# Patient Record
Sex: MALE | Employment: STUDENT | URBAN - METROPOLITAN AREA
[De-identification: names, ages, dates, MRNs, and addresses within clinical notes are randomized per-mention and may not be internally consistent; named-entity substitution may affect disease eponyms.]

---

## 2017-06-09 ENCOUNTER — TRANSFERRED RECORDS (OUTPATIENT)
Dept: HEALTH INFORMATION MANAGEMENT | Facility: CLINIC | Age: 22
End: 2017-06-09

## 2019-09-09 ENCOUNTER — OFFICE VISIT (OUTPATIENT)
Dept: FAMILY MEDICINE | Facility: CLINIC | Age: 24
End: 2019-09-09
Payer: COMMERCIAL

## 2019-09-09 VITALS
HEART RATE: 68 BPM | OXYGEN SATURATION: 99 % | DIASTOLIC BLOOD PRESSURE: 74 MMHG | WEIGHT: 158.2 LBS | BODY MASS INDEX: 22.65 KG/M2 | HEIGHT: 70 IN | SYSTOLIC BLOOD PRESSURE: 115 MMHG | TEMPERATURE: 97.4 F | RESPIRATION RATE: 16 BRPM

## 2019-09-09 DIAGNOSIS — J06.9 ACUTE URI: ICD-10-CM

## 2019-09-09 DIAGNOSIS — R05.9 COUGH: Primary | ICD-10-CM

## 2019-09-09 RX ORDER — AZITHROMYCIN 250 MG/1
TABLET, FILM COATED ORAL
Qty: 6 TABLET | Refills: 0 | Status: SHIPPED | OUTPATIENT
Start: 2019-09-09 | End: 2019-09-14

## 2019-09-09 ASSESSMENT — ENCOUNTER SYMPTOMS
FEVER: 0
SHORTNESS OF BREATH: 1
COUGH: 1
CHILLS: 0
FATIGUE: 1

## 2019-09-09 ASSESSMENT — PAIN SCALES - GENERAL: PAINLEVEL: NO PAIN (0)

## 2019-09-09 ASSESSMENT — MIFFLIN-ST. JEOR: SCORE: 1713.84

## 2019-09-09 NOTE — PATIENT INSTRUCTIONS

## 2019-09-09 NOTE — PROGRESS NOTES
"       HPI       Frank Huff is a 23 year old Man who presents with cough and chest tightness. Frank is a generally healthy man. He has not had any significant diseases or illness.   Chief Complaint   Patient presents with     Cough     Pt complains of a cough for the past 2 weeks.     Concern: Cough, chest tightness   Description of the problem :he has been sick for two weeks. He is taking Robitussin during the day and Nyquil at night, senait seltzer cold and cough drops. He is not feeling any better, the cough is disrupting his sleep. He is here for evaluation and treatment.      Problem, Medication and Allergy Lists were reviewed and updated if needed.  Patient is not an established patient of this clinic.         Review of Systems:   Review of Systems     Constitutional:  Positive for fatigue. Negative for fever and chills.   Respiratory:   Positive for cough and shortness of breath.    Cardiovascular:  Positive for chest pain.               Physical Exam:     Vitals:    09/09/19 0750   BP: 115/74   BP Location: Right arm   Patient Position: Sitting   Cuff Size: Adult Regular   Pulse: 68   Resp: 16   Temp: 97.4  F (36.3  C)   TempSrc: Oral   SpO2: 99%   Weight: 71.8 kg (158 lb 3.2 oz)   Height: 1.778 m (5' 10\")     Body mass index is 22.7 kg/m .  Vitals were reviewed and were normal     Physical Exam   Constitutional: He is oriented to person, place, and time.   HENT:   Head: Normocephalic and atraumatic.   Neck: Normal range of motion. Neck supple.   Musculoskeletal: Normal range of motion.   Neurological: He is alert and oriented to person, place, and time.   Skin: Skin is warm and dry.   Psychiatric: He has a normal mood and affect. His behavior is normal.       Results:     None ordered today    Assessment and Plan     1. Cough  2. Acute URI  There are no discontinued medications.  First, due to his presenting symptoms combined with the longevity of his illness, I feel it is appropriate to try a " zpack for his URI. Frank will continue all otc treatment he was doing as well. He will return for follow up with worsening or persisting symptoms. Options for treatment and follow-up care were reviewed with the patient. Frank Huff  engaged in the decision making process and verbalized understanding of the options discussed and agreed with the final plan.  Camila Tracey, ROSARIO, CNP

## 2019-09-09 NOTE — NURSING NOTE
Chief Complaint   Patient presents with     Cough     Pt complains of a cough for the past 2 weeks.         Dameon Pena, EMT on 9/9/2019 at 7:50 AM

## 2020-08-03 ENCOUNTER — VIRTUAL VISIT (OUTPATIENT)
Dept: ORTHOPEDICS | Facility: CLINIC | Age: 25
End: 2020-08-03

## 2020-08-03 DIAGNOSIS — Z98.890 H/O MEDIAL MENISCUS REPAIR OF RIGHT KNEE: ICD-10-CM

## 2020-08-03 DIAGNOSIS — M25.561 ACUTE PAIN OF RIGHT KNEE: Primary | ICD-10-CM

## 2020-08-03 NOTE — LETTER
"8/3/2020       RE: Frank Huff  [address]  [place] [facility] [address]     Dear Colleague,    Thank you for referring your patient, Frank Huff, to the [facility] at [facility]. Please see a copy of my visit note below.    Frank Huff is a 24 year old male who is being evaluated via a billable video visit.      The patient has been notified of following:     \"This video visit will be conducted via a call between you and your physician/provider. We have found that certain health care needs can be provided without the need for an in-person physical exam.  This service lets us provide the care you need with a video conversation.  If a prescription is necessary we can send it directly to your pharmacy.  If lab work is needed we can place an order for that and you can then stop by our lab to have the test done at a later time.    Video visits are billed at different rates depending on your insurance coverage.  Please reach out to your insurance provider with any questions.    If during the course of the call the physician/provider feels a video visit is not appropriate, you will not be charged for this service.\"    Patient has given verbal consent for Video visit? Yes  How would you like to obtain your AVS? MyChart  If you are dropped from the video visit, the video invite should be resent to: Send to e-mail at: [email or web address]  Will anyone else be joining your video visit? No        Video-Visit Details    Type of service:  Video Visit    Video Start Time: 12:59  Video End Time: 1:21    Originating Location (pt. Location): Home    Distant Location (provider location):  [facility] [employer] [facility]     Platform used for Video Visit: Diabeto          PMH:  Right knee meniscal repair surgery [place] 2011    Active problem list:  There is no problem list on file for this patient.      FH:  Family History   Problem Relation Age of Onset     Coronary Artery " Disease Father      Cancer Maternal Grandfather        SH:  Social History     Socioeconomic History     Marital status: Single     Spouse name: Not on file     Number of children: Not on file     Years of education: Not on file     Highest education level: Not on file   Occupational History     Not on file   Social Needs     Financial resource strain: Not on file     Food insecurity     Worry: Not on file     Inability: Not on file     Transportation needs     Medical: Not on file     Non-medical: Not on file   Tobacco Use     Smoking status: Never Smoker     Smokeless tobacco: Never Used   Substance and Sexual Activity     Alcohol use: Not on file     Drug use: Not on file     Sexual activity: Not on file   Lifestyle     Physical activity     Days per week: Not on file     Minutes per session: Not on file     Stress: Not on file   Relationships     Social connections     Talks on phone: Not on file     Gets together: Not on file     Attends Faith service: Not on file     Active member of club or organization: Not on file     Attends meetings of clubs or organizations: Not on file     Relationship status: Not on file     Intimate partner violence     Fear of current or ex partner: Not on file     Emotionally abused: Not on file     Physically abused: Not on file     Forced sexual activity: Not on file   Other Topics Concern     Not on file   Social History Narrative     Not on file     Law student, clerking    MEDS:  See EMR, reviewed  ALL:  See EMR, reviewed    REVIEW OF SYSTEMS:  CONSTITUTIONAL:NEGATIVE for fever, chills, change in weight  INTEGUMENTARY/SKIN: NEGATIVE for worrisome rashes, moles or lesions  EYES: NEGATIVE for vision changes or irritation  ENT/MOUTH: NEGATIVE for ear, mouth and throat problems  RESP:NEGATIVE for significant cough or SOB  BREAST: NEGATIVE for masses, tenderness or discharge  CV: NEGATIVE for chest pain, palpitations or peripheral edema  GI: NEGATIVE for nausea, abdominal pain,  heartburn, or change in bowel habits  :NEGATIVE for frequency, dysuria, or hematuria  :NEGATIVE for frequency, dysuria, or hematuria  NEURO: NEGATIVE for weakness, dizziness or paresthesias  ENDOCRINE: NEGATIVE for temperature intolerance, skin/hair changes  HEME/ALLERGY/IMMUNE: NEGATIVE for bleeding problems  PSYCHIATRIC: NEGATIVE for changes in mood or affect      Subjective: Onset right-sided knee discomfort after athletic activity 4 weeks ago, improved after 2 weeks.  Can think of no particular injury.  6 weeks ago started a home online program of lunges and squats guided by occasional instructor.  Has been doing this regularly.  Also return to pickup basketball twice a week.  Particular in movements of cutting and pivoting and basketball can be uncomfortable but he can think of no particular injury.  The knee does not lock.  Occasional catching sensation.  No persistent swelling.  Status post a right-sided meniscal repair 2011.  Right knee did Well athletically since then.  Basketball is his main sport that he enjoys.  Currently a law student, clerking.    GENERAL: healthy, alert, without distress  EYES: Eyes grossly normal to inspection.   HENT: normal cephalic/atraumatic  NECK: No asymmetry, visible masses or scars  RESP: No audible wheeze, cough, or visible cyanosis.  No visible retractions or increased work of breathing.    SKIN: Visible skin clear. No visible rash, abnormal pigmentation or lesions.  NEURO: Cranial nerves grossly intact.  Mentation and speech appropriate for age.  PSYCH: mentation appears normal, concentration appears appropriate, judgement and insight intact and appearance well groomed  MSK: He can do a full squat today without knee discomfort.  The right knee reveals no effusion.  He can flex and extend it fully.  Overlying skin is intact.    Assessment right-sided knee discomfort x4 weeks, improved in the past 2 weeks.  Suspect patellofemoral discomfort versus possible recurrent  meniscal tear    Plan: We discussed activity modification over the next month.  He plans on avoiding basketball for now which is reasonable.  He is thinking about some biking which is also reasonable.  He will see physical therapy for an alignment training program and proper squat and lunge form.  If he has persistent mechanical symptoms of locking catching swelling over the next 4 weeks despite physical therapy an x-ray and MRI of the knee can be considered.  He had no further questions at this time.          Again, thank you for allowing me to participate in the care of your patient.      Sincerely,    Benigno Molina MD

## 2020-08-03 NOTE — LETTER
Date:August 4, 2020      Patient was self referred, no letter generated. Do not send.        [facility] Health Information

## 2020-08-03 NOTE — PROGRESS NOTES
"Frank Huff is a 24 year old male who is being evaluated via a billable video visit.      The patient has been notified of following:     \"This video visit will be conducted via a call between you and your physician/provider. We have found that certain health care needs can be provided without the need for an in-person physical exam.  This service lets us provide the care you need with a video conversation.  If a prescription is necessary we can send it directly to your pharmacy.  If lab work is needed we can place an order for that and you can then stop by our lab to have the test done at a later time.    Video visits are billed at different rates depending on your insurance coverage.  Please reach out to your insurance provider with any questions.    If during the course of the call the physician/provider feels a video visit is not appropriate, you will not be charged for this service.\"    Patient has given verbal consent for Video visit? Yes  How would you like to obtain your AVS? MyChart  If you are dropped from the video visit, the video invite should be resent to: Send to e-mail at: [email or web address]  Will anyone else be joining your video visit? No        Video-Visit Details    Type of service:  Video Visit    Video Start Time: 12:59  Video End Time: 1:21    Originating Location (pt. Location): Home    Distant Location (provider location):  [facility]     Platform used for Video Visit: Lemonwise          PMH:  Right knee meniscal repair surgery [place] 2011    Active problem list:  There is no problem list on file for this patient.      FH:  Family History   Problem Relation Age of Onset     Coronary Artery Disease Father      Cancer Maternal Grandfather        SH:  Social History     Socioeconomic History     Marital status: Single     Spouse name: Not on file     Number of children: Not on file     Years of education: Not on file     Highest education level: Not on file   Occupational History "     Not on file   Social Needs     Financial resource strain: Not on file     Food insecurity     Worry: Not on file     Inability: Not on file     Transportation needs     Medical: Not on file     Non-medical: Not on file   Tobacco Use     Smoking status: Never Smoker     Smokeless tobacco: Never Used   Substance and Sexual Activity     Alcohol use: Not on file     Drug use: Not on file     Sexual activity: Not on file   Lifestyle     Physical activity     Days per week: Not on file     Minutes per session: Not on file     Stress: Not on file   Relationships     Social connections     Talks on phone: Not on file     Gets together: Not on file     Attends [employer] service: Not on file     Active member of club or organization: Not on file     Attends meetings of clubs or organizations: Not on file     Relationship status: Not on file     Intimate partner violence     Fear of current or ex partner: Not on file     Emotionally abused: Not on file     Physically abused: Not on file     Forced sexual activity: Not on file   Other Topics Concern     Not on file   Social History Narrative     Not on file     Law student, clerking    MEDS:  See EMR, reviewed  ALL:  See EMR, reviewed    REVIEW OF SYSTEMS:  CONSTITUTIONAL:NEGATIVE for fever, chills, change in weight  INTEGUMENTARY/SKIN: NEGATIVE for worrisome rashes, moles or lesions  EYES: NEGATIVE for vision changes or irritation  ENT/MOUTH: NEGATIVE for ear, mouth and throat problems  RESP:NEGATIVE for significant cough or SOB  BREAST: NEGATIVE for masses, tenderness or discharge  CV: NEGATIVE for chest pain, palpitations or peripheral edema  GI: NEGATIVE for nausea, abdominal pain, heartburn, or change in bowel habits  :NEGATIVE for frequency, dysuria, or hematuria  :NEGATIVE for frequency, dysuria, or hematuria  NEURO: NEGATIVE for weakness, dizziness or paresthesias  ENDOCRINE: NEGATIVE for temperature intolerance, skin/hair changes  HEME/ALLERGY/IMMUNE: NEGATIVE  for bleeding problems  PSYCHIATRIC: NEGATIVE for changes in mood or affect      Subjective: Onset right-sided knee discomfort after athletic activity 4 weeks ago, improved after 2 weeks.  Can think of no particular injury.  6 weeks ago started a home online program of lunges and squats guided by occasional instructor.  Has been doing this regularly.  Also return to pickup basketball twice a week.  Particular in movements of cutting and pivoting and basketball can be uncomfortable but he can think of no particular injury.  The knee does not lock.  Occasional catching sensation.  No persistent swelling.  Status post a right-sided meniscal repair 2011.  Right knee did Well athletically since then.  Basketball is his main sport that he enjoys.  Currently a law student, clerking.    GENERAL: healthy, alert, without distress  EYES: Eyes grossly normal to inspection.   HENT: normal cephalic/atraumatic  NECK: No asymmetry, visible masses or scars  RESP: No audible wheeze, cough, or visible cyanosis.  No visible retractions or increased work of breathing.    SKIN: Visible skin clear. No visible rash, abnormal pigmentation or lesions.  NEURO: Cranial nerves grossly intact.  Mentation and speech appropriate for age.  PSYCH: mentation appears normal, concentration appears appropriate, judgement and insight intact and appearance well groomed  MSK: He can do a full squat today without knee discomfort.  The right knee reveals no effusion.  He can flex and extend it fully.  Overlying skin is intact.    Assessment right-sided knee discomfort x4 weeks, improved in the past 2 weeks.  Suspect patellofemoral discomfort versus possible recurrent meniscal tear    Plan: We discussed activity modification over the next month.  He plans on avoiding basketball for now which is reasonable.  He is thinking about some biking which is also reasonable.  He will see physical therapy for an alignment training program and proper squat and lunge form.   If he has persistent mechanical symptoms of locking catching swelling over the next 4 weeks despite physical therapy an x-ray and MRI of the knee can be considered.  He had no further questions at this time.

## 2020-08-03 NOTE — LETTER
"  8/3/2020      RE: Frank Huff  [address] [facility] [address]       Frank Huff is a 24 year old male who is being evaluated via a billable video visit.      The patient has been notified of following:     \"This video visit will be conducted via a call between you and your physician/provider. We have found that certain health care needs can be provided without the need for an in-person physical exam.  This service lets us provide the care you need with a video conversation.  If a prescription is necessary we can send it directly to your pharmacy.  If lab work is needed we can place an order for that and you can then stop by our lab to have the test done at a later time.    Video visits are billed at different rates depending on your insurance coverage.  Please reach out to your insurance provider with any questions.    If during the course of the call the physician/provider feels a video visit is not appropriate, you will not be charged for this service.\"    Patient has given verbal consent for Video visit? Yes  How would you like to obtain your AVS? MyChart  If you are dropped from the video visit, the video invite should be resent to: Send to e-mail at: [email or web address]  Will anyone else be joining your video visit? No        Video-Visit Details    Type of service:  Video Visit    Video Start Time: 12:59  Video End Time: 1:21    Originating Location (pt. Location): Home    Distant Location (provider location):  [facility]     Platform used for Video Visit: DidLog          PMH:  Right knee meniscal repair surgery [place] 2011    Active problem list:  There is no problem list on file for this patient.      FH:  Family History   Problem Relation Age of Onset     Coronary Artery Disease Father      Cancer Maternal Grandfather        SH:  Social History     Socioeconomic History     Marital status: Single     Spouse name: Not on file     Number of children: Not on file     Years of " education: Not on file     Highest education level: Not on file   Occupational History     Not on file   Social Needs     Financial resource strain: Not on file     Food insecurity     Worry: Not on file     Inability: Not on file     Transportation needs     Medical: Not on file     Non-medical: Not on file   Tobacco Use     Smoking status: Never Smoker     Smokeless tobacco: Never Used   Substance and Sexual Activity     Alcohol use: Not on file     Drug use: Not on file     Sexual activity: Not on file   Lifestyle     Physical activity     Days per week: Not on file     Minutes per session: Not on file     Stress: Not on file   Relationships     Social connections     Talks on phone: Not on file     Gets together: Not on file     Attends Sabianism service: Not on file     Active member of club or organization: Not on file     Attends meetings of clubs or organizations: Not on file     Relationship status: Not on file     Intimate partner violence     Fear of current or ex partner: Not on file     Emotionally abused: Not on file     Physically abused: Not on file     Forced sexual activity: Not on file   Other Topics Concern     Not on file   Social History Narrative     Not on file     Law student, clerking    MEDS:  See EMR, reviewed  ALL:  See EMR, reviewed    REVIEW OF SYSTEMS:  CONSTITUTIONAL:NEGATIVE for fever, chills, change in weight  INTEGUMENTARY/SKIN: NEGATIVE for worrisome rashes, moles or lesions  EYES: NEGATIVE for vision changes or irritation  ENT/MOUTH: NEGATIVE for ear, mouth and throat problems  RESP:NEGATIVE for significant cough or SOB  BREAST: NEGATIVE for masses, tenderness or discharge  CV: NEGATIVE for chest pain, palpitations or peripheral edema  GI: NEGATIVE for nausea, abdominal pain, heartburn, or change in bowel habits  :NEGATIVE for frequency, dysuria, or hematuria  :NEGATIVE for frequency, dysuria, or hematuria  NEURO: NEGATIVE for weakness, dizziness or paresthesias  ENDOCRINE:  NEGATIVE for temperature intolerance, skin/hair changes  HEME/ALLERGY/IMMUNE: NEGATIVE for bleeding problems  PSYCHIATRIC: NEGATIVE for changes in mood or affect      Subjective: Onset right-sided knee discomfort after athletic activity 4 weeks ago, improved after 2 weeks.  Can think of no particular injury.  6 weeks ago started a home online program of lunges and squats guided by occasional instructor.  Has been doing this regularly.  Also return to pickup basketball twice a week.  Particular in movements of cutting and pivoting and basketball can be uncomfortable but he can think of no particular injury.  The knee does not lock.  Occasional catching sensation.  No persistent swelling.  Status post a right-sided meniscal repair 2011.  Right knee did Well athletically since then.  Basketball is his main sport that he enjoys.  Currently a law student, clerking.    GENERAL: healthy, alert, without distress  EYES: Eyes grossly normal to inspection.   HENT: normal cephalic/atraumatic  NECK: No asymmetry, visible masses or scars  RESP: No audible wheeze, cough, or visible cyanosis.  No visible retractions or increased work of breathing.    SKIN: Visible skin clear. No visible rash, abnormal pigmentation or lesions.  NEURO: Cranial nerves grossly intact.  Mentation and speech appropriate for age.  PSYCH: mentation appears normal, concentration appears appropriate, judgement and insight intact and appearance well groomed  MSK: He can do a full squat today without knee discomfort.  The right knee reveals no effusion.  He can flex and extend it fully.  Overlying skin is intact.    Assessment right-sided knee discomfort x4 weeks, improved in the past 2 weeks.  Suspect patellofemoral discomfort versus possible recurrent meniscal tear    Plan: We discussed activity modification over the next month.  He plans on avoiding basketball for now which is reasonable.  He is thinking about some biking which is also reasonable.  He will  see physical therapy for an alignment training program and proper squat and lunge form.  If he has persistent mechanical symptoms of locking catching swelling over the next 4 weeks despite physical therapy an x-ray and MRI of the knee can be considered.  He had no further questions at this time.          Benigno Molina MD

## 2020-08-03 NOTE — LETTER
Date:August 4, 2020      Patient was self referred, no letter generated. Do not send.        [facility] Information

## 2020-08-06 ENCOUNTER — THERAPY VISIT (OUTPATIENT)
Dept: PHYSICAL THERAPY | Facility: CLINIC | Age: 25
End: 2020-08-06
Attending: FAMILY MEDICINE
Payer: COMMERCIAL

## 2020-08-06 DIAGNOSIS — M25.561 RIGHT KNEE PAIN: Primary | ICD-10-CM

## 2020-08-06 PROCEDURE — 97110 THERAPEUTIC EXERCISES: CPT | Mod: GP | Performed by: PHYSICAL THERAPIST

## 2020-08-06 PROCEDURE — 97161 PT EVAL LOW COMPLEX 20 MIN: CPT | Mod: GP | Performed by: PHYSICAL THERAPIST

## 2020-08-06 ASSESSMENT — ACTIVITIES OF DAILY LIVING (ADL)
KNEE_ACTIVITY_OF_DAILY_LIVING_SCORE: 84.29
STIFFNESS: THE SYMPTOM AFFECTS MY ACTIVITY SLIGHTLY
STAND: ACTIVITY IS NOT DIFFICULT
LIMPING: I HAVE THE SYMPTOM BUT IT DOES NOT AFFECT MY ACTIVITY
RISE FROM A CHAIR: ACTIVITY IS NOT DIFFICULT
HOW_WOULD_YOU_RATE_THE_OVERALL_FUNCTION_OF_YOUR_KNEE_DURING_YOUR_USUAL_DAILY_ACTIVITIES?: NEARLY NORMAL
PAIN: THE SYMPTOM AFFECTS MY ACTIVITY MODERATELY
GO UP STAIRS: ACTIVITY IS MINIMALLY DIFFICULT
SIT WITH YOUR KNEE BENT: ACTIVITY IS NOT DIFFICULT
HOW_WOULD_YOU_RATE_THE_CURRENT_FUNCTION_OF_YOUR_KNEE_DURING_YOUR_USUAL_DAILY_ACTIVITIES_ON_A_SCALE_FROM_0_TO_100_WITH_100_BEING_YOUR_LEVEL_OF_KNEE_FUNCTION_PRIOR_TO_YOUR_INJURY_AND_0_BEING_THE_INABILITY_TO_PERFORM_ANY_OF_YOUR_USUAL_DAILY_ACTIVITIES?: 60
GIVING WAY, BUCKLING OR SHIFTING OF KNEE: I HAVE THE SYMPTOM BUT IT DOES NOT AFFECT MY ACTIVITY
KNEEL ON THE FRONT OF YOUR KNEE: ACTIVITY IS MINIMALLY DIFFICULT
RAW_SCORE: 59
WALK: ACTIVITY IS MINIMALLY DIFFICULT
GO DOWN STAIRS: ACTIVITY IS MINIMALLY DIFFICULT
SQUAT: ACTIVITY IS NOT DIFFICULT
SWELLING: I DO NOT HAVE THE SYMPTOM
AS_A_RESULT_OF_YOUR_KNEE_INJURY,_HOW_WOULD_YOU_RATE_YOUR_CURRENT_LEVEL_OF_DAILY_ACTIVITY?: ABNORMAL
WEAKNESS: I DO NOT HAVE THE SYMPTOM
KNEE_ACTIVITY_OF_DAILY_LIVING_SUM: 59

## 2020-08-06 NOTE — PROGRESS NOTES
[facility] Initial Evaluation  Subjective:  [facility]                  [facility]  Physical Therapy Initial Examination/Evaluation  August 6, 2020    Frank Huff is a 24 year old  male referred to physical therapy by Dr. Molina for treatment of knee pain with Precautions/Restrictions/MD instructions none    KEY PT FINDINGS:  1.  Concern for medial meniscal pathology  2.  Genu varum alignment R>L  3.  No joint effusion  4. Grade Ia Lachman's    Subjective:  Referring MD visit date: 8/3/20  DOI/onset: July 4, 2020  Mechanism of injury: Noticed knee was painful and didn't feel right after playing [employer]  DOS 2011 medial meniscus repair at [place]  Previous treatment: none  Imaging: none  Chief Complaint:   Patient states that his knee doesn't feel quite right when making lateral movements.  Decreased confidence with running and cutting/pivoting.  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    Current HEP/exercise regimen: bball, playing with dog, biking,   Patient's goals are improve confidence in knee    Pertinent PMH: none   General Health Reported by Patient: excellent  Return to MD:  PRN        Lower Extremity Exam    Dynamic Movement Screen:  2 leg stance: genu varum  2 leg squat:Normal      1 leg stance:   Right: normal  Left: normal    1 leg squat:   Right: proprioceptive challenge  Left: normal    Gait: wnl    Patellofemoral Joint Examination:  Test Right Left   Patellar Orientation:   90 deg flexion neutral neutral   OKC Patellar Tracking Normal Normal   Patellar Orientation:  0 deg flexion neutral neutral   Quadrant  Mobility (Med:Lat) 1:1  1:1    Effusion 0 0   Quad Activation Depressed intensity Normal     Knee Joint AROM   Right Left Difference   Hyperextension 0 deg 0 deg 0 deg   Extension 0 deg 0 deg 0 deg   Flexion 135 deg 145 deg 10 deg     Palpation:   Tender to palpation at the following structures: medial joint line    Hip Joint PROM Screen   Right Left Difference   Flex 120 deg 120 deg 0 deg   ER 60 deg 60 deg 0 deg   IR 30 deg 30 deg 0 deg     Lower Extremity Muscle Strength (x/5)   Right Left   Hip ER 5-/5 5-/5   Hip IR 5/5 5/5   Hip ABD 4+/5 5/5   Hip Ext 5/5 5/5   Knee Flex 5/5 5/5     Ligament Special Tests:    Lachman's: grade Ia on R    Meniscal special test:    Thessaly's: negative on R  Salazar's:              Objective:  System    Physical Exam    General     ROS    Assessment/Plan:    Patient is a 24 year old male with right side knee complaints.    Patient has the following significant findings with corresponding treatment plan.                Diagnosis 1:  Knee pain, concern for meniscus tear    Pain -  hot/cold therapy, US, electric stimulation, manual therapy, splint/taping/bracing/orthotics, self management, education and home program  Decreased strength - therapeutic exercise and therapeutic activities  Impaired balance - neuro re-education and therapeutic activities  Decreased proprioception - neuro re-education and therapeutic activities  Impaired muscle performance - neuro re-education  Decreased function - therapeutic activities    Therapy Evaluation Codes:   1) History comprised of:   Personal factors that impact the plan of care:      None.    Comorbidity factors that impact the plan of care are:      None.     Medications impacting care: None.  2) Examination of Body Systems comprised of:   Body structures and functions that impact the plan of care:      Knee.   Activity limitations that impact the plan of care are:      Sports, Squatting/kneeling and Stairs.  3) Clinical presentation  characteristics are:   Stable/Uncomplicated.  4) Decision-Making    Low complexity using standardized patient assessment instrument and/or measureable assessment of functional outcome.  Cumulative Therapy Evaluation is: Low complexity.    Previous and current functional limitations:  (See Goal Flow Sheet for this information)    Short term and Long term goals: (See Goal Flow Sheet for this information)     Communication ability:  Patient appears to be able to clearly communicate and understand verbal and written communication and follow directions correctly.  Treatment Explanation - The following has been discussed with the patient:   RX ordered/plan of care  Anticipated outcomes  Possible risks and side effects  This patient would benefit from PT intervention to resume normal activities.   Rehab potential is good.    Frequency:  1 X week, once daily  Duration:  for 4 weeks  Discharge Plan:  Achieve all LTG.  Independent in home treatment program.  Reach maximal therapeutic benefit.    Please refer to the daily flowsheet for treatment today, total treatment time and time spent performing 1:1 timed codes.

## 2020-08-08 DIAGNOSIS — S89.91XD ACUTE INJURY OF RIGHT KNEE CARTILAGE, SUBSEQUENT ENCOUNTER: Primary | ICD-10-CM

## 2020-08-08 DIAGNOSIS — Z98.890 H/O MEDIAL MENISCUS REPAIR OF RIGHT KNEE: ICD-10-CM

## 2020-08-20 ENCOUNTER — THERAPY VISIT (OUTPATIENT)
Dept: PHYSICAL THERAPY | Facility: CLINIC | Age: 25
End: 2020-08-20
Payer: COMMERCIAL

## 2020-08-20 DIAGNOSIS — M25.561 RIGHT KNEE PAIN: Primary | ICD-10-CM

## 2020-08-20 PROCEDURE — 97110 THERAPEUTIC EXERCISES: CPT | Mod: GP | Performed by: PHYSICAL THERAPIST

## 2020-08-25 NOTE — TELEPHONE ENCOUNTER
DIAGNOSIS: Right knee pain, concern for meniscus tear. MRI on 8/26   APPOINTMENT DATE: 9.2.2020   NOTES STATUS DETAILS   OFFICE NOTE from referring provider Internal 8.3.20 CAMERON Molina [facility]   OFFICE NOTE from other specialist N/A    DISCHARGE SUMMARY from hospital N/A    DISCHARGE REPORT from the ER N/A    OPERATIVE REPORT In process R knee meniscal repair surgery, [place], 2011   MEDICATION LIST Internal    MRI N/A    CT SCAN N/A    XRAYS (IMAGES & REPORTS) N/A        Action MJ 8.25.20   Action Taken Called pt to find out where surgery was performed. [facility] in [place]. Called [phone] and received fax number [phone]. Sent request     8/28/20-PATRICIA  Called  [facility] to confirm record request was received-the options to not allow you to leave voice mail-2nd fax request sent    8/31/20-PATRICIA  Called  medical record dept. Unable to reach live person,also sent another urgent fax request    9/1/20-PATRICIA  Received an e-mail stating QUENTIN is needed for records    9/1/20  Called patient to get a signed QUENTIN, no answer [facility]

## 2020-08-26 ENCOUNTER — ANCILLARY PROCEDURE (OUTPATIENT)
Dept: MRI IMAGING | Facility: CLINIC | Age: 25
End: 2020-08-26
Attending: FAMILY MEDICINE

## 2020-08-26 DIAGNOSIS — Z98.890 H/O MEDIAL MENISCUS REPAIR OF RIGHT KNEE: ICD-10-CM

## 2020-08-26 DIAGNOSIS — S89.91XD ACUTE INJURY OF RIGHT KNEE CARTILAGE, SUBSEQUENT ENCOUNTER: ICD-10-CM

## 2020-08-27 ENCOUNTER — TELEPHONE (OUTPATIENT)
Dept: ORTHOPEDICS | Facility: CLINIC | Age: 25
End: 2020-08-27

## 2020-08-27 NOTE — TELEPHONE ENCOUNTER
Discussed with patient by phone MRI knee results.  It shows some complex meniscal tearing of the posterior medial meniscus in the setting of his previous meniscal repair in 2011.  He has an already scheduled appointment with Dr. Black next week to discuss it.    The MRI also showed bone bruising with a suggestion of a nondisplaced proximal fibular head fracture.  This is on the lateral side of the knee where he has denied discomfort.  He denies discomfort there today.  He has denied recent injury to the knee.  However he does qualify that at the end of June, 8 weeks ago, he was kneed forcibly in the lateral knee by an opponent when he was playing basketball.  He limped off the court and the knee was uncomfortable for a few days but then improved.  Then he returned to basketball and started his home program of squats and lunges.  It was then that he started to notice medial knee discomfort.    He plans on seeing Dr. Black in clinic in 1 week.  Knee x-rays will be added to that visit.    pm

## 2020-08-31 ENCOUNTER — ANCILLARY PROCEDURE (OUTPATIENT)
Dept: GENERAL RADIOLOGY | Facility: CLINIC | Age: 25
End: 2020-08-31
Attending: FAMILY MEDICINE

## 2020-09-02 ENCOUNTER — PRE VISIT (OUTPATIENT)
Dept: ORTHOPEDICS | Facility: CLINIC | Age: 25
End: 2020-09-02

## 2020-09-02 ENCOUNTER — OFFICE VISIT (OUTPATIENT)
Dept: ORTHOPEDICS | Facility: CLINIC | Age: 25
End: 2020-09-02

## 2020-09-02 VITALS — BODY MASS INDEX: 21.47 KG/M2 | HEIGHT: 70 IN | WEIGHT: 150 LBS

## 2020-09-02 DIAGNOSIS — M25.561 ACUTE PAIN OF RIGHT KNEE: Primary | ICD-10-CM

## 2020-09-02 ASSESSMENT — MIFFLIN-ST. JEOR: SCORE: 1671.65

## 2020-09-02 NOTE — PROGRESS NOTES
CHIEF CONCERN: Right knee pain    HISTORY:   24-year-old otherwise healthy male with right knee pain of 3 months duration.  Had a collision playing basketball late July, but no residual effects.  2 weeks later while playing had onset of right knee pain, no twisting or traumatic event to initiate symptoms.  2 weeks of swelling and pain followed.  Symptoms responded to symptomatic treatment.  Start physical therapy with some improvement of symptoms.  Denies locking or catching, no instability.  No recurrent effusions.  Daily right knee pain, medial knee.  Remote history of a right knee meniscal repair in 2011 done in [place].  Recovered well from this.  No other trauma.  Denies tingling or numbness    PAST MEDICAL HISTORY: (Reviewed with the patient and in the EPIC medical record)  1. None    PAST SURGICAL HISTORY: (Reviewed with the patient and in the EPIC medical record)  1. Right knee meniscal repair, 2011, [place].    MEDICATIONS: (Reviewed with the patient and in the EPIC medical record)    Notable medications include: None    ALLERGIES: (Reviewed with the patient and in the EPIC medical record)  1. 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  Previously enjoyed basketball and running, not doing this due to his knee    FAMILY HISTORY: (Reviewed with the patient and in the medical record)  -- No family history of bleeding, clotting, or difficulty with anesthesia    REVIEW OF SYSTEMS: (Reviewed with the patient and on the health intake form)  -- A comprehensive 10 point review of systems was conducted and is negative except as noted in the HPI    EXAM:     General: Awake, Alert and Oriented, No acute Distress. Articulate and Interactive    Body mass index is 21.52 kg/m .    Right lower extremity :    Skin is Warm and Well perfused, no  suggestion of infection    Trace effusion    Minimal medial joint line tenderness    Normal muscle tone, no atrophy    Range of motion -5-130    Stable to valgus varus    IA Lachman, negative Pivot Shift    Pain with Salazar, no mechanical symptoms    Patella tracks centrally    EHL/FHL/TA/GS 5/5    Sensation intact L3-S1    2+ Dorsalis Pedis Pulse    IMAGING:    Plain Radiographs: No fracture dislocation.  Maintained joint space.  Normal alignment    MRI: Lateral tibial and fibular head bony edema, no fracture lines identified.  Articular cartilage normal, no defects, no thinning.  Cruciate ligaments intact, subtle signal in the ACL with possible partial empty wall sign.  Collateral ligaments normal and intact.  Lateral meniscus intact.  Complex posterior horn medial meniscus tearing.    ASSESSMENT:  1. 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 Likely post ACL injury, slight increased laxity with firm endpoint and no symptoms of instability  3. Resolving lateral tibial plateau contusion    PLAN:  1. Discussed the findings with Frank, reviewed the imaging, answered all questions.  Reviewed both surgical and nonsurgical management of symptomatic medial meniscus tear.  Reviewed the risks, benefits, alternatives.  2. Plan to proceed with non-operative management at this time, will give the knee a trial and contsct clinic if he is unable to tolerate symptoms  3. Plan would be arthroscopic partial menisectomy, will call if wanting to proceed with surgery, can be seen in virtual follow up if desired      Patient seen and discussed with Dr. Black, who agrees with the above plan      Evaristo Rosario MD  Orthopedic Surgery, PGY-5  9:18 AM  September 2, 2020

## 2020-09-02 NOTE — LETTER
Date:September 3, 2020      Patient was self referred, no letter generated. Do not send.        [facility] Health Information

## 2020-09-02 NOTE — PROGRESS NOTES
Patient seen and examined with the resident.     Assesment: Symptomatic re-tear of the right medial meniscus    Resolved lateral tibial plateau and fibular contusion following basketball injury    Subtle increased translation with firm endpoint of right ACL    Plan: Long discussion with the patient regarding his right knee reviewed with him my thoughts regarding his meniscus tear.  At this time the patient would like to challenge his knee see how he does.  If he remains symptomatic we will consider arthroscopic meniscectomy.  If he is not having symptoms we will allow him to return to desired activities.  He is voiced understanding of this.  He will follow-up with me on an as-needed basis.  He is okay to call, have a virtual visit or phone call if he would like to pursue surgery.    I agree with history, physical and imaging as well as the assessment and plan as detailed by Dr. Rosario.       Addendum:  The patient reached out to me via the electronic medical record and stated that he is interested in proceeding with surgery.  This would be an arthroscopic meniscectomy.  He would like to do this at the very end of December early January.  I placed an order in the computer.  We will look for time to get on the schedule.

## 2020-09-02 NOTE — LETTER
9/2/2020      RE: Frank CAMERON Huff  [address]  [facility] [address]       CHIEF CONCERN: Right knee pain    HISTORY:   24-year-old otherwise healthy male with right knee pain of 3 months duration.  Had a collision playing basketball late July, but no residual effects.  2 weeks later while playing had onset of right knee pain, no twisting or traumatic event to initiate symptoms.  2 weeks of swelling and pain followed.  Symptoms responded to symptomatic treatment.  Start physical therapy with some improvement of symptoms.  Denies locking or catching, no instability.  No recurrent effusions.  Daily right knee pain, medial knee.  Remote history of a right knee meniscal repair in 2011 done in [place].  Recovered well from this.  No other trauma.  Denies tingling or numbness    PAST MEDICAL HISTORY: (Reviewed with the patient and in the [facility] medical record)  1. None    PAST SURGICAL HISTORY: (Reviewed with the patient and in the [facility] medical record)  1. Right knee meniscal repair, 2011, [place].    MEDICATIONS: (Reviewed with the patient and in the [facility] medical record)    Notable medications include: None    ALLERGIES: (Reviewed with the patient and in the [facility] medical record)  1. 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  Previously enjoyed basketball and running, not doing this due to his knee    FAMILY HISTORY: (Reviewed with the patient and in the medical record)  -- No family history of bleeding, clotting, or difficulty with anesthesia    REVIEW OF SYSTEMS: (Reviewed with the patient and on the health intake form)  -- A comprehensive 10 point review of systems was conducted and is negative except as noted in the HPI    EXAM:     General: Awake, Alert and Oriented, No acute Distress. Articulate and Interactive    Body mass  index is 21.52 kg/m .    Right lower extremity :    Skin is Warm and Well perfused, no suggestion of infection    Trace effusion    Minimal medial joint line tenderness    Normal muscle tone, no atrophy    Range of motion -5-130    Stable to valgus varus    IA Lachman, negative Pivot Shift    Pain with Salazar, no mechanical symptoms    Patella tracks centrally    EHL/FHL/TA/GS 5/5    Sensation intact L3-S1    2+ Dorsalis Pedis Pulse    IMAGING:    Plain Radiographs: No fracture dislocation.  Maintained joint space.  Normal alignment    MRI: Lateral tibial and fibular head bony edema, no fracture lines identified.  Articular cartilage normal, no defects, no thinning.  Cruciate ligaments intact, subtle signal in the ACL with possible partial empty wall sign.  Collateral ligaments normal and intact.  Lateral meniscus intact.  Complex posterior horn medial meniscus tearing.    ASSESSMENT:  1. 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 Likely post ACL injury, slight increased laxity with firm endpoint and no symptoms of instability  3. Resolving lateral tibial plateau contusion    PLAN:  1. Discussed the findings with Frank, reviewed the imaging, answered all questions.  Reviewed both surgical and nonsurgical management of symptomatic medial meniscus tear.  Reviewed the risks, benefits, alternatives.  2. Plan to proceed with non-operative management at this time, will give the knee a trial and contsct clinic if he is unable to tolerate symptoms  3. Plan would be arthroscopic partial menisectomy, will call if wanting to proceed with surgery, can be seen in virtual follow up if desired      Patient seen and discussed with Dr. Black, who agrees with the above plan      Evaristo Rosario MD  Orthopedic Surgery, PGY-5  9:18 AM  September 2, 2020        Patient seen and examined with the resident.     Assesment: Symptomatic re-tear of the right medial  meniscus    Resolved lateral tibial plateau and fibular contusion following basketball injury    Subtle increased translation with firm endpoint of right ACL    Plan: Long discussion with the patient regarding his right knee reviewed with him my thoughts regarding his meniscus tear.  At this time the patient would like to challenge his knee see how he does.  If he remains symptomatic we will consider arthroscopic meniscectomy.  If he is not having symptoms we will allow him to return to desired activities.  He is voiced understanding of this.  He will follow-up with me on an as-needed basis.  He is okay to call, have a virtual visit or phone call if he would like to pursue surgery.    I agree with history, physical and imaging as well as the assessment and plan as detailed by Dr. Rosario.       Greg Black MD

## 2020-09-12 NOTE — PROGRESS NOTES
"Date: 2020 17:58:11  Clinician: Nelson Centeno  Clinician NPI: [id number]  Patient: Frank Huff  Patient : 1995  Patient Address: [address] [place] [address]  Patient Phone: [phone]  Visit Protocol: URI  Patient Summary:  Frank is a 24 year old ( : 1995 ) male who initiated a [facility] Visit for COVID-19 (Coronavirus) evaluation and screening. When asked the question \"Please sign me up to receive news, health information and promotions. \", Frank responded \"No\".    Frank states his symptoms started today.   His symptoms consist of a sore throat.   Symptom details   Sore throat: Frank reports having mild throat pain (1-3 on a 10 point pain scale), does not have exudate on his tonsils, and can swallow liquids. The lymph nodes in his neck are not enlarged. A rash has not appeared on the skin since the sore throat started.    Frank denies having ear pain, anosmia, facial pain or pressure, fever, vomiting, rhinitis, nausea, wheezing, teeth pain, ageusia, diarrhea, cough, nasal congestion, headache, chills, malaise, enlarged lymph nodes, and myalgias. He also denies taking antibiotic medication in the past month and having recent facial or sinus surgery in the past 60 days. He is not experiencing dyspnea.   Precipitating events  Within the past week, Frank has not been exposed to someone with strep throat.   Pertinent COVID-19 (Coronavirus) information  In the past 14 days, Frank has not worked in a congregate living setting.   He does not work or volunteer as healthcare worker or a  and does not work or volunteer in a healthcare facility.   Frank also has not lived in a congregate living setting in the past 14 days. He lives with a healthcare worker.   Frank has not had a close contact with a laboratory-confirmed COVID-19 patient within 14 days of symptom onset.   Since 2019, Frank and has not had upper respiratory infection or " influenza-like illness. Has not been diagnosed with lab-confirmed COVID-19 test   Pertinent medical history  Frank does not need a return to work/school note.   Weight: 150 lbs   Frank does not smoke or use smokeless tobacco.   Weight: 150 lbs    MEDICATIONS: No current medications, ALLERGIES: NKDA  Clinician Response:  Dear Frank,      Rapid Strep Test - [place]    I am sorry you are not feeling well. Based on the information provided, it is possible you could have strep throat. When left untreated, strep can spread to other areas of the body and cause a more serious infection.  A strep test is the only way to determine if a bacterial infection or a virus is causing your sore throat. This is done in a lab where a swab of the back of your throat is collected and tested for the bacteria that causes strep.  Since you chose not to use the lab order, please be seen:     In a clinic or urgent care    Within 24 hours     Call 911 or go to the emergency room if you suddenly develop a rash, are drooling or unable to swallow fluids, if you are having difficulty breathing, or feel that your throat is closing off.   Diagnosis: [facility] Strep  Diagnosis ICD: J02.0  [facility] Results: Rapid Strep Test - [place] - Declined  [facility]icket Secondary Results: null

## 2020-10-12 PROBLEM — M25.561 ACUTE PAIN OF RIGHT KNEE: Status: ACTIVE | Noted: 2020-10-12

## 2020-10-19 ENCOUNTER — VIRTUAL VISIT (OUTPATIENT)
Dept: ORTHOPEDICS | Facility: CLINIC | Age: 25
End: 2020-10-19
Payer: COMMERCIAL

## 2020-10-19 DIAGNOSIS — S89.91XD ACUTE INJURY OF RIGHT KNEE CARTILAGE, SUBSEQUENT ENCOUNTER: Primary | ICD-10-CM

## 2020-10-19 PROCEDURE — 99213 OFFICE O/P EST LOW 20 MIN: CPT | Mod: 95 | Performed by: ORTHOPAEDIC SURGERY

## 2020-10-19 NOTE — PROGRESS NOTES
"Frank Huff is a 25 year old male who is being evaluated via a billable video visit.      The patient has been notified of following:     \"This video visit will be conducted via a call between you and your physician/provider. We have found that certain health care needs can be provided without the need for an in-person physical exam.  This service lets us provide the care you need with a video conversation.  If a prescription is necessary we can send it directly to your pharmacy.  If lab work is needed we can place an order for that and you can then stop by our lab to have the test done at a later time.    Video visits are billed at different rates depending on your insurance coverage.  Please reach out to your insurance provider with any questions.    If during the course of the call the physician/provider feels a video visit is not appropriate, you will not be charged for this service.\"    Patient has given verbal consent for Video visit? Yes  How would you like to obtain your AVS? MyChart  Will anyone else be joining your video visit? No      Very pleasant 25-year-old male who I was able to complete a video visit with today.  I saw him earlier in September of this year.  At that time I felt that his ACL was intact he had large bone bruise in his knee and a concern for a meniscus tear.  In light of this information I asked the patient to challenge his knee to see how he did.  He did this and had some return of his symptoms.  In light of this he called into our office and we scheduled surgery for December of this year.  He had a couple questions regarding the meniscectomy and an office visit was arranged to be done virtually.    No examination was completed today as this was a virtual visit    Clinical assessment: Symptomatic retear of the right medial meniscus, resolved lateral tibial plateau and fibular contusion following basketball injury intact ACL on examination    Plan:  Long discussion today with the " patient.  Reviewed with him his diagnosis the potential treatment options.  Our surgical plan will be examination under anesthesia, knee arthroscopy, partial meniscectomy.  I discussed with the patient the risks benefits complications techniques and alternatives to surgery.  We reviewed the expected course of recovery alternative treatment options.  We will look for time and this can be complete.  It is scheduled the end of December.      Video-Visit Details    Type of service:  Video Visit    Video Start Time: 0920  Video End Time: 0931    Originating Location (pt. Location): Home    Distant Location (provider location):  [facility]     Platform used for Video Visit: Tulio Black MD

## 2020-10-19 NOTE — LETTER
"    10/19/2020         RE: Frank Huff  [address]  [facility] [address]        Dear Colleague,    Thank you for referring your patient, Frank Huff, to the [facility] [place]. Please see a copy of my visit note below.    Frank Huff is a 25 year old male who is being evaluated via a billable video visit.      The patient has been notified of following:     \"This video visit will be conducted via a call between you and your physician/provider. We have found that certain health care needs can be provided without the need for an in-person physical exam.  This service lets us provide the care you need with a video conversation.  If a prescription is necessary we can send it directly to your pharmacy.  If lab work is needed we can place an order for that and you can then stop by our lab to have the test done at a later time.    Video visits are billed at different rates depending on your insurance coverage.  Please reach out to your insurance provider with any questions.    If during the course of the call the physician/provider feels a video visit is not appropriate, you will not be charged for this service.\"    Patient has given verbal consent for Video visit? Yes  How would you like to obtain your AVS? MyChart  Will anyone else be joining your video visit? No      Very pleasant 25-year-old male who I was able to complete a video visit with today.  I saw him earlier in September of this year.  At that time I felt that his ACL was intact he had large bone bruise in his knee and a concern for a meniscus tear.  In light of this information I asked the patient to challenge his knee to see how he did.  He did this and had some return of his symptoms.  In light of this he called into our office and we scheduled surgery for December of this year.  He had a couple questions regarding the meniscectomy and an office visit was arranged to be done virtually.    No " examination was completed today as this was a virtual visit    Clinical assessment: Symptomatic retear of the right medial meniscus, resolved lateral tibial plateau and fibular contusion following basketball injury intact ACL on examination    Plan:  Long discussion today with the patient.  Reviewed with him his diagnosis the potential treatment options.  Our surgical plan will be examination under anesthesia, knee arthroscopy, partial meniscectomy.  I discussed with the patient the risks benefits complications techniques and alternatives to surgery.  We reviewed the expected course of recovery alternative treatment options.  We will look for time and this can be complete.  It is scheduled the end of December.      Video-Visit Details    Type of service:  Video Visit    Video Start Time: 0920  Video End Time: 0931    Originating Location (pt. Location): Home    Distant Location (provider location):  [facility]     Platform used for Video Visit: Tulio Black MD

## 2020-10-22 DIAGNOSIS — Z11.59 ENCOUNTER FOR SCREENING FOR OTHER VIRAL DISEASES: Primary | ICD-10-CM

## 2020-12-07 ENCOUNTER — TELEPHONE (OUTPATIENT)
Dept: FAMILY MEDICINE | Facility: CLINIC | Age: 25
End: 2020-12-07

## 2020-12-07 NOTE — TELEPHONE ENCOUNTER
LVM please call to our call center to schedule an appointment.     JONAH Mccoy 10:12 AM  12/7/2020

## 2020-12-08 ENCOUNTER — OFFICE VISIT (OUTPATIENT)
Dept: FAMILY MEDICINE | Facility: CLINIC | Age: 25
End: 2020-12-08
Payer: COMMERCIAL

## 2020-12-08 ENCOUNTER — ANCILLARY PROCEDURE (OUTPATIENT)
Dept: ULTRASOUND IMAGING | Facility: CLINIC | Age: 25
End: 2020-12-08
Attending: NURSE PRACTITIONER
Payer: COMMERCIAL

## 2020-12-08 VITALS
SYSTOLIC BLOOD PRESSURE: 125 MMHG | OXYGEN SATURATION: 100 % | DIASTOLIC BLOOD PRESSURE: 79 MMHG | BODY MASS INDEX: 21.52 KG/M2 | TEMPERATURE: 97.6 F | HEIGHT: 70 IN | HEART RATE: 65 BPM

## 2020-12-08 DIAGNOSIS — R22.9 LUMP OF SKIN: Primary | ICD-10-CM

## 2020-12-08 DIAGNOSIS — L72.3 SEBACEOUS CYST: ICD-10-CM

## 2020-12-08 DIAGNOSIS — R22.9 LUMP OF SKIN: ICD-10-CM

## 2020-12-08 PROCEDURE — 99213 OFFICE O/P EST LOW 20 MIN: CPT | Performed by: NURSE PRACTITIONER

## 2020-12-08 PROCEDURE — 76882 US LMTD JT/FCL EVL NVASC XTR: CPT | Mod: LT

## 2020-12-08 ASSESSMENT — ANXIETY QUESTIONNAIRES
5. BEING SO RESTLESS THAT IT IS HARD TO SIT STILL: NOT AT ALL
7. FEELING AFRAID AS IF SOMETHING AWFUL MIGHT HAPPEN: NOT AT ALL
GAD7 TOTAL SCORE: 0
1. FEELING NERVOUS, ANXIOUS, OR ON EDGE: NOT AT ALL
GAD7 TOTAL SCORE: 0
6. BECOMING EASILY ANNOYED OR IRRITABLE: NOT AT ALL
2. NOT BEING ABLE TO STOP OR CONTROL WORRYING: NOT AT ALL
7. FEELING AFRAID AS IF SOMETHING AWFUL MIGHT HAPPEN: NOT AT ALL
4. TROUBLE RELAXING: NOT AT ALL
3. WORRYING TOO MUCH ABOUT DIFFERENT THINGS: NOT AT ALL

## 2020-12-08 ASSESSMENT — PAIN SCALES - GENERAL: PAINLEVEL: NO PAIN (0)

## 2020-12-08 NOTE — PATIENT INSTRUCTIONS
Please have your Ultra sound, I will notify you of the results.   [facility] Medication Refill Request Information:  * Please contact your pharmacy regarding ANY request for medication refills.  ** NP Clinic Prescription Fax = [phone]  * Please allow 3 business days for routine medication refills.  * Please allow 5 business days for controlled substance medication refills.     [facility] Test Result notification information:  *You will be notified with in 7-10 days of your appointment day regarding the results of your test.  If you are on MyChart you will be notified as soon as the provider has reviewed the results and signed off on them.    [facility]: [phone]

## 2020-12-08 NOTE — NURSING NOTE
Chief Complaint   Patient presents with     Mass     Lump on L armpit     Nini Brooks, RMA 4:19 PM  12/8/2020

## 2020-12-08 NOTE — PROGRESS NOTES
"       HPI       Frank Huff is a 25 year old man who presents with an axillary lump.  Chief Complaint   Patient presents with     Mass     Lump on L armpit   Serafin explains that 3 weeks ago, he noticed a lump under his left armpit. He thought maybe it was an ingrown hair. It has not changed in size. It is tender to palpation. He presents for exam and evaluation.     Problem, Medication and Allergy Lists were reviewed and updated if needed.    Patient is an established patient of this clinic.           Review of Systems:   Review of Systems     Constitutional:  Negative for fever, chills and fatigue.               Physical Exam:     Vitals:    12/08/20 1616   BP: 125/79   Pulse: 65   Temp: 97.6  F (36.4  C)   TempSrc: Oral   SpO2: 100%   Height: 1.778 m (5' 10\")     Body mass index is 21.52 kg/m .  Vitals were reviewed and were normal.     Physical Exam  Vitals signs reviewed.   Constitutional:       Appearance: Normal appearance.   HENT:      Head: Normocephalic.   Musculoskeletal: Normal range of motion.   Skin:     General: Skin is warm and dry.             Comments: Grape sized nodule palpated in left axilla. Tender to palpation, no s/s redness or infection.    Neurological:      General: No focal deficit present.      Mental Status: He is alert and oriented to person, place, and time.   Psychiatric:         Mood and Affect: Mood normal.         Behavior: Behavior normal.         Results:   US pending.   Assessment and Plan     1. Lump of skin    - US Extremity Non Vascular Left; Future  - GENERAL SURG ADULT REFERRAL    2. Sebaceous cyst    - GENERAL SURG ADULT REFERRAL    First, have the US completed and I will call with results. Next, please call with any concerns or questions. Options for treatment and follow-up care were reviewed with the patient. Frank Huff  engaged in the decision making process and verbalized understanding of the options discussed and agreed with the final plan.  Camila" ROSARIO Tracey, CNP  Addendum 12/09/2020 0828:  US results:   Findings:   Complex cystic lesion located in the skin at the site of symptoms  measuring 1.2 x 1.2 x 0.6 cm.  Impression: Left axillary sebaceous cyst/epidermal inclusion cyst.  I called to share the results, and discussed next steps including a general surgery referral. Serafin verbalizes understanding of plan and will call with any questions or concerns. He verbalizes understanding of the plan.   ROSARIO Garcia, CNP

## 2020-12-09 ASSESSMENT — ENCOUNTER SYMPTOMS
CHILLS: 0
FATIGUE: 0
FEVER: 0

## 2020-12-09 ASSESSMENT — ANXIETY QUESTIONNAIRES: GAD7 TOTAL SCORE: 0

## 2020-12-14 NOTE — TELEPHONE ENCOUNTER
REFERRAL INFORMATION:    Referring Provider:  Camila Tracey NP     Referring Clinic:  [facility] [place]    Reason for Visit/Diagnosis: Sebaceous Cyst       FUTURE VISIT INFORMATION:    Appointment Date: 12/18/2020    Appointment Time: 9 AM      NOTES RECORD STATUS  DETAILS   OFFICE NOTE from Referring Provider Internal 12/8/2020 Office visit with Camila Tracey NP    OFFICE NOTE from Other Specialists N/A    HOSPITAL DISCHARGE SUMMARY/ ED VISITS  N/A    OPERATIVE REPORT N/A    ENDOSCOPY (EGD)  N/A    PERTINENT LABS Internal    PATHOLOGY REPORTS (RELATED) N/A    IMAGING (CT, MRI, US, XR)  Internal US Extremity: 12/8/2020

## 2020-12-18 ENCOUNTER — OFFICE VISIT (OUTPATIENT)
Dept: SURGERY | Facility: CLINIC | Age: 25
End: 2020-12-18
Attending: NURSE PRACTITIONER
Payer: COMMERCIAL

## 2020-12-18 ENCOUNTER — PRE VISIT (OUTPATIENT)
Dept: SURGERY | Facility: CLINIC | Age: 25
End: 2020-12-18

## 2020-12-18 VITALS
DIASTOLIC BLOOD PRESSURE: 79 MMHG | WEIGHT: 150.8 LBS | SYSTOLIC BLOOD PRESSURE: 107 MMHG | HEART RATE: 81 BPM | TEMPERATURE: 98.5 F | HEIGHT: 70 IN | BODY MASS INDEX: 21.59 KG/M2 | OXYGEN SATURATION: 94 %

## 2020-12-18 DIAGNOSIS — L72.3 SEBACEOUS CYST: Primary | ICD-10-CM

## 2020-12-18 PROCEDURE — 99203 OFFICE O/P NEW LOW 30 MIN: CPT | Performed by: SURGERY

## 2020-12-18 ASSESSMENT — MIFFLIN-ST. JEOR: SCORE: 1670.27

## 2020-12-18 ASSESSMENT — PAIN SCALES - GENERAL: PAINLEVEL: NO PAIN (0)

## 2020-12-18 NOTE — LETTER
12/18/2020       RE: Frank Huff  [address]  [facility] [address]     Dear Colleague,    Thank you for referring your patient, Frank Huff, to the [facility] [place] at [facility]. Please see a copy of my visit note below.    New General Surgery Consultation Note     Frank Huff  [id number]  1995 December 18, 2020     Requesting Provider: Camila Tracey     Dear Dr Trujillo Ref-Primary, Physician,     I had the pleasure of seeing your patient, Frank Huff.  He is a 25 year old male who is being seen in consultation for the following concern(s).     CHIEF COMPLAINT:  Chief Complaint Reviewed With Patient 12/18/2020   I am here today to be seen for: Cyst       HISTORY OF PRESENT ILLNESS:  Healthy 25M presents w L axillary cyst x several weeks, shown by ultrasound. Today, pt notes the cyst has gotten smaller and is minimally bothersome.    LOCATION:  L axilla    SEVERITY:   Severity of Present Illness Reviewed With Patient 12/18/2020   How would you describe your symptoms? N/A   Does your current concern alter your level of activities? No       TIMING:  Timing of Present Illness Reviewed With Patient 12/18/2020   The onset of my symptoms was: Sudden   My symptoms are: Constant   My symptoms have been: Staying the Same       DURATION:  No flowsheet data found.     MODIFYING FACTORS:  No flowsheet data found.      ROS     PAST MEDICAL HISTORY:  No past medical history on file.     PAST SURGICAL HISTORY:  Past Surgical History:   Procedure Laterality Date     ARTHROSCOPY KNEE WITH MENISCAL REPAIR Right 01/2010    having this again 12/2020        MEDICATIONS:  No current outpatient medications on file.     No current facility-administered medications for this visit.         ALLERGIES:  No Known Allergies     SOCIAL HISTORY:  Social History     Socioeconomic History     Marital status: Single     Spouse name: Not on  file     Number of children: Not on file     Years of education: Not on file     Highest education level: Not on file   Occupational History     Not on file   Social Needs     Financial resource strain: Not on file     Food insecurity     Worry: Not on file     Inability: Not on file     Transportation needs     Medical: Not on file     Non-medical: Not on file   Tobacco Use     Smoking status: Never Smoker     Smokeless tobacco: Never Used   Substance and Sexual Activity     Alcohol use: Not on file     Drug use: Not on file     Sexual activity: Not on file   Lifestyle     Physical activity     Days per week: Not on file     Minutes per session: Not on file     Stress: Not on file   Relationships     Social connections     Talks on phone: Not on file     Gets together: Not on file     Attends Worship service: Not on file     Active member of club or organization: Not on file     Attends meetings of clubs or organizations: Not on file     Relationship status: Not on file     Intimate partner violence     Fear of current or ex partner: Not on file     Emotionally abused: Not on file     Physically abused: Not on file     Forced sexual activity: Not on file   Other Topics Concern     Not on file   Social History Narrative     Not on file       FAMILY HISTORY:  Family History   Problem Relation Age of Onset     Coronary Artery Disease Father      Cancer Maternal Grandfather         PHYSICAL EXAM:  Vital Signs: There were no vitals taken for this visit.  HEENT: NCAT; MMM;   Lungs: Breathing unlabored  ~0.5cm L axillary nodule, non-draining, non-tender, non-erythematous       PERTINENT IMAGING/TESTING:  Ultrasound reviewed        ASSESSMENT:   Frank Huff is a 25 year old male with L axillary cyst. The cyst is very smll now and non-bothersome. I offered Mr Huff the options of 1) CSC removal under local (including cyst capsule), 2) I and D in office (though would likely recur), and 3) continued observation,  with the understanding that he would contact me if he wants this addressed. Pt chose to continue to observe after I offered reassurance.    PLAN:   -Observation; pt will let me know if wants removed or I and D'd. I explained that symptoms such as erythema, drainage or fevers would warrant more urgent treatment, but this appears to be very benign.    Sincerely,     Barak London MD

## 2020-12-18 NOTE — PROGRESS NOTES
New General Surgery Consultation Note        Frank Huff  [id number]  1995 December 18, 2020     Requesting Provider: Camila Tracey     Dear Dr Trujillo Ref-Primary, Physician,     I had the pleasure of seeing your patient, Frank Huff.  He is a 25 year old male who is being seen in consultation for the following concern(s).     CHIEF COMPLAINT:  Chief Complaint Reviewed With Patient 12/18/2020   I am here today to be seen for: Cyst       HISTORY OF PRESENT ILLNESS:  Healthy 25M presents w L axillary cyst x several weeks, shown by ultrasound. Today, pt notes the cyst has gotten smaller and is minimally bothersome.    LOCATION:  L axilla    SEVERITY:   Severity of Present Illness Reviewed With Patient 12/18/2020   How would you describe your symptoms? N/A   Does your current concern alter your level of activities? No       TIMING:  Timing of Present Illness Reviewed With Patient 12/18/2020   The onset of my symptoms was: Sudden   My symptoms are: Constant   My symptoms have been: Staying the Same       DURATION:  No flowsheet data found.     MODIFYING FACTORS:  No flowsheet data found.      ROS     PAST MEDICAL HISTORY:  No past medical history on file.     PAST SURGICAL HISTORY:  Past Surgical History:   Procedure Laterality Date     ARTHROSCOPY KNEE WITH MENISCAL REPAIR Right 01/2010    having this again 12/2020        MEDICATIONS:  No current outpatient medications on file.     No current facility-administered medications for this visit.         ALLERGIES:  No Known Allergies     SOCIAL HISTORY:  Social History     Socioeconomic History     Marital status: Single     Spouse name: Not on file     Number of children: Not on file     Years of education: Not on file     Highest education level: Not on file   Occupational History     Not on file   Social Needs     Financial resource strain: Not on file     Food insecurity     Worry: Not on file     Inability: Not on file     Transportation needs      Medical: Not on file     Non-medical: Not on file   Tobacco Use     Smoking status: Never Smoker     Smokeless tobacco: Never Used   Substance and Sexual Activity     Alcohol use: Not on file     Drug use: Not on file     Sexual activity: Not on file   Lifestyle     Physical activity     Days per week: Not on file     Minutes per session: Not on file     Stress: Not on file   Relationships     Social connections     Talks on phone: Not on file     Gets together: Not on file     Attends [employer] service: Not on file     Active member of club or organization: Not on file     Attends meetings of clubs or organizations: Not on file     Relationship status: Not on file     Intimate partner violence     Fear of current or ex partner: Not on file     Emotionally abused: Not on file     Physically abused: Not on file     Forced sexual activity: Not on file   Other Topics Concern     Not on file   Social History Narrative     Not on file       FAMILY HISTORY:  Family History   Problem Relation Age of Onset     Coronary Artery Disease Father      Cancer Maternal Grandfather         PHYSICAL EXAM:  Vital Signs: There were no vitals taken for this visit.  HEENT: NCAT; MMM;   Lungs: Breathing unlabored  ~0.5cm L axillary nodule, non-draining, non-tender, non-erythematous       PERTINENT IMAGING/TESTING:  Ultrasound reviewed        ASSESSMENT:   Frank Huff is a 25 year old male with L axillary cyst. The cyst is very smll now and non-bothersome. I offered Mr Huff the options of 1) CSC removal under local (including cyst capsule), 2) I and D in office (though would likely recur), and 3) continued observation, with the understanding that he would contact me if he wants this addressed. Pt chose to continue to observe after I offered reassurance.      PLAN:   -Observation; pt will let me know if wants removed or I and D'd. I explained that symptoms such as erythema, drainage or fevers would warrant more urgent treatment,  but this appears to be very benign.    Sincerely,     Barak London MD

## 2020-12-18 NOTE — NURSING NOTE
"Chief Complaint   Patient presents with     New Patient     cyst       Vitals:    12/18/20 0922   BP: 107/79   BP Location: Right arm   Patient Position: Chair   Cuff Size: Adult Regular   Pulse: 81   Temp: 98.5  F (36.9  C)   SpO2: 94%   Weight: 68.4 kg (150 lb 12.8 oz)   Height: 1.778 m (5' 10\")       Body mass index is 21.64 kg/m .                            "

## 2020-12-24 ENCOUNTER — ANESTHESIA EVENT (OUTPATIENT)
Dept: SURGERY | Facility: AMBULATORY SURGERY CENTER | Age: 25
End: 2020-12-24

## 2020-12-24 ENCOUNTER — PRE VISIT (OUTPATIENT)
Dept: SURGERY | Facility: CLINIC | Age: 25
End: 2020-12-24

## 2020-12-24 ENCOUNTER — VIRTUAL VISIT (OUTPATIENT)
Dept: SURGERY | Facility: CLINIC | Age: 25
End: 2020-12-24
Payer: COMMERCIAL

## 2020-12-24 VITALS — HEIGHT: 70 IN | WEIGHT: 150 LBS | BODY MASS INDEX: 21.47 KG/M2

## 2020-12-24 DIAGNOSIS — Z01.818 PREOP EXAMINATION: Primary | ICD-10-CM

## 2020-12-24 PROCEDURE — 99203 OFFICE O/P NEW LOW 30 MIN: CPT | Mod: 95 | Performed by: CLINICAL NURSE SPECIALIST

## 2020-12-24 SDOH — HEALTH STABILITY: MENTAL HEALTH: HOW MANY STANDARD DRINKS CONTAINING ALCOHOL DO YOU HAVE ON A TYPICAL DAY?: NOT ASKED

## 2020-12-24 SDOH — HEALTH STABILITY: MENTAL HEALTH: HOW OFTEN DO YOU HAVE A DRINK CONTAINING ALCOHOL?: NOT ASKED

## 2020-12-24 SDOH — HEALTH STABILITY: MENTAL HEALTH: HOW OFTEN DO YOU HAVE 6 OR MORE DRINKS ON ONE OCCASION?: NOT ASKED

## 2020-12-24 ASSESSMENT — MIFFLIN-ST. JEOR: SCORE: 1666.65

## 2020-12-24 ASSESSMENT — PAIN SCALES - GENERAL: PAINLEVEL: NO PAIN (0)

## 2020-12-24 NOTE — PATIENT INSTRUCTIONS
Preparing for Your Surgery      Name:  Frank Huff   MRN:  [id number]   :  1995   Today's Date:  2020         Arriving for surgery:  Surgery date:  2020  Arrival time:  6:30AM    Restrictions due to COVID 19:  No visitors are allowed at this time.         parking is available for anyone with mobility limitations or disabilities. (Monday- Friday 7 am- 5 pm)    Please come to:    [facility]  [address] [place] [id number]    Check in on the 5th floor, Ambulatory Surgery Center.    What can I eat or drink?    -  You may eat and drink normally until 8 hours before surgery. (Until Midnight)  -  You may have clear liquids up to 4 hours before surgery. (Until 2020, 4AM)    Examples of clear liquids:  Water  Clear broth  Juices (apple, white grape, white cranberry  and cider) without pulp  Noncarbonated, powder based beverages  (lemonade and Albino-Aid)  Sodas (Sprite, 7-Up, ginger ale and seltzer)  Coffee or tea (without milk or cream)  Gatorade    --No alcohol for at least 24 hours before surgery    Which medicines can I take?    Hold Aspirin for 7 days before surgery.   Hold Multivitamins for 7 days before surgery.  Hold Supplements for 7 days before surgery.  Hold Ibuprofen (Advil, Motrin) for 1 day before surgery--unless otherwise directed by surgeon.  Hold Naproxen (Aleve) for 4 days before surgery.        How do I prepare myself?  - Please take 2 showers before surgery using Scrubcare or Hibiclens soap.    Use this soap only from the neck to your toes.     Leave the soap on your skin for one minute--then rinse thoroughly.      You may use your own shampoo and conditioner; no other hair products.   - Please remove all jewelry and body piercings.  - No lotions, deodorants or fragrance.   - Bring your ID and insurance card.        - All patients are required to have a Covid-19 test within 4 days of surgery/procedure.      -Patients will be  contacted by the [facility] scheduling team within 1 week of surgery to make an appointment.      - Patients may call the Scheduling team at [phone] if they have not been scheduled within 4 days of  surgery.      ALL PATIENTS ARE REQUIRED TO HAVE A RESPONSIBLE ADULT TO DRIVE AND BE IN ATTENDANCE WITH THEM FOR 24 HOURS FOLLOWING SURGERY       Questions or Concerns:    -For questions regarding the day of surgery please contact the Ambulatory Surgery Center at [phone].    -If you have health changes between today and your surgery please contact your surgeon.     For questions after surgery please call your surgeons office.

## 2020-12-24 NOTE — TELEPHONE ENCOUNTER
FUTURE VISIT INFORMATION      SURGERY INFORMATION:    Date: 12/29/20    Location:  OR    Surgeon:  Greg Black MD    Anesthesia Type:  choice    Procedure: Examination under anesthesia, arthroscopy and partial meniscectomy    RECORDS REQUESTED FROM:       Pertinent Medical History: None

## 2020-12-24 NOTE — ANESTHESIA PREPROCEDURE EVALUATION
"Anesthesia Pre-Procedure Evaluation    Patient: Frank Huff   MRN:     [id number] Gender:   male   Age:    25 year old :      1995        Preoperative Diagnosis: Acute pain of right knee [M25.561]   Procedure(s):  Examination under anesthesia, arthroscopy and partial meniscectomy     LABS:  CBC: No results found for: WBC, HGB, HCT, PLT  BMP: No results found for: NA, POTASSIUM, CHLORIDE, CO2, BUN, CR, GLC  COAGS: No results found for: PTT, INR, FIBR  POC: No results found for: BGM, HCG, HCGS  OTHER: No results found for: PH, LACT, A1C, KIMANI, PHOS, MAG, ALBUMIN, PROTTOTAL, ALT, AST, GGT, ALKPHOS, BILITOTAL, BILIDIRECT, LIPASE, AMYLASE, [place]ST[place], TSH, T4, T3, CRP, SED     Preop Vitals    BP Readings from Last 3 Encounters:   20 107/79   20 125/79   19 115/74    Pulse Readings from Last 3 Encounters:   20 81   20 65   19 68      Resp Readings from Last 3 Encounters:   19 16    SpO2 Readings from Last 3 Encounters:   20 94%   20 100%   19 99%      Temp Readings from Last 1 Encounters:   20 98.5  F (36.9  C)    Ht Readings from Last 1 Encounters:   20 1.778 m (5' 10\")      Wt Readings from Last 1 Encounters:   20 68 kg (150 lb)    Estimated body mass index is 21.52 kg/m  as calculated from the following:    Height as of this encounter: 1.778 m (5' 10\").    Weight as of this encounter: 68 kg (150 lb).     LDA:        Past Medical History:   Diagnosis Date     Acute pain of right knee       Past Surgical History:   Procedure Laterality Date     ARTHROSCOPY KNEE WITH MENISCAL REPAIR Right 2010    having this again 2020     [place] teeth extraction        No Known Allergies     Anesthesia Evaluation     . Pt has had prior anesthetic. Type: MAC and General    No history of anesthetic complications          ROS/MED HX    ENT/Pulmonary:  - neg pulmonary ROS     Neurologic:  - neg neurologic ROS     Cardiovascular:  - neg cardiovascular " ROS   (+) ----. : . . . :. . No previous cardiac testing       METS/Exercise Tolerance:  >4 METS   Hematologic:  - neg hematologic  ROS       Musculoskeletal: Comment: S/p right knee meniscal repair 2010        GI/Hepatic:  - neg GI/hepatic ROS       Renal/Genitourinary:  - ROS Renal section negative       Endo:  - neg endo ROS       Psychiatric:  - neg psychiatric ROS       Infectious Disease:  - neg infectious disease ROS       Malignancy:      - no malignancy   Other:    (+) No chance of pregnancy C-spine cleared: N/A, H/O Chronic Pain,no other significant disability                        PHYSICAL EXAM:   Mental Status/Neuro:    Airway:    Respiratory:    CV:    Comments: Virtual visit                     Assessment:   ASA SCORE: 1    H&P: History and physical reviewed and following examination; no interval change.    NPO Status: NPO Appropriate     Plan:   Anes. Type:  General   Pre-Medication: None   Induction:  IV (Standard)   Airway: LMA   Access/Monitoring: PIV   Maintenance: Balanced     Postop Plan:   Postop Pain: Opioids  Postop Sedation/Airway: Not planned  Disposition: Outpatient     PONV Management:   Adult Risk Factors:, Postop Opioids   Prevention: Ondansetron, Dexamethasone     CONSENT: Direct conversation   Plan and risks discussed with: Patient                   PAC Discussion and Assessment    ASA Classification: 1  Case is suitable for: ASC  Anesthetic techniques and relevant risks discussed: GA, MAC with GA as backup and Regional  Invasive monitoring and risk discussed: No  Types:   Possibility and Risk of blood transfusion discussed: No  NPO instructions given:   Additional anesthetic preparation and risks discussed:   Needs early admission to pre-op area:   Other:     PAC Resident/NP Anesthesia Assessment:  Frank Huff is a 25 year old male scheduled to undergo Examination under anesthesia, arthroscopy and partial meniscectomy with Dr. Black on 12/29/20. He has the following  specific operative considerations:   - RCRI : No serious cardiac risks.    - VTE risk: 0.5%  - [place] # of risks 1/8 = Low risk   - Risk of PONV score = 2.  If > 2, anti-emetic intervention recommended.    --Acute pain of right knee with above procedure now planned.   --No history of problems with anesthesia.  --Generally healthy with no significant cardiopulmonary history. Nonsmoker. Good activity tolerance.     Arrival time, NPO, shower and medication instructions provided by nursing staff today.     Patient is optimized and is acceptable candidate for the proposed procedure.  No further diagnostic evaluation is needed.                 Reviewed and Signed by PAC Mid-Level Provider/Resident  Mid-Level Provider/Resident: ROSARIO Glynn, LATESHA  Date: 12/24/20  Time: 9:12am    Attending Anesthesiologist Anesthesia Assessment:        Anesthesiologist:   Date:   Time:   Pass/Fail:   Disposition:     PAC Pharmacist Assessment:        Pharmacist:   Date:   Time:    ROSARIO Ruggiero

## 2020-12-24 NOTE — H&P
Pre-Operative H & P     CC:  Preoperative exam to assess for increased cardiopulmonary risk while undergoing surgery and anesthesia.    Date of Encounter: 2020  Primary Care Physician:  No Ref-Primary, Physician  Reason for visit: Acute pain of right knee [M25.561]    HPI  Frank Huff is a 25 year old male who presents for pre-operative H & P in preparation for Examination under anesthesia, arthroscopy and partial meniscectomy with Dr. Black on 20 at [facility]. History is obtained from the patient and medical records.    At the beginning of this visit patient ID was confirmed using name and .     Video-Visit Details    Type of service:  Video Visit    Patient verbally consented to video service today: YES      Video Start Time: 8:49am  Video End Time (time video stopped): 8:56am    Originating Location (pt. Location): Home    Distant Location (provider location):  [facility] PREOPERAT[facility]    Mode of Communication:  Video Conference via [email or web address]    Patient who has been recently followed by Dr. Black for pain in his right knee after a past basketball injury. In , he underwent right knee meniscal repair. He is now having return of his knee pain which is interfering with activity. His imaging was reviewed and he was counseled for above procedure.     He is otherwise healthy and takes no medications. Today patient denies fever, cough, shortness of breath, chest pain, or irregular HR.    Past Medical History  Past Medical History:   Diagnosis Date     Acute pain of right knee        Past Surgical History  Past Surgical History:   Procedure Laterality Date     ARTHROSCOPY KNEE WITH MENISCAL REPAIR Right 2010    having this again 2020     Badger teeth extraction         Hx of Blood transfusions/reactions: Denies.      Hx of abnormal bleeding or anti-platelet use: Denies.     Menstrual history: No LMP for male patient.    Steroid use in the  last year: Denies.     Personal or FH with difficulty with Anesthesia:  Denies.     Prior to Admission Medications  No current outpatient medications on file.       Allergies  No Known Allergies    Social History  Social History     Socioeconomic History     Marital status: Single     Spouse name: Not on file     Number of children: Not on file     Years of education: Not on file     Highest education level: Not on file   Occupational History     Not on file   Social Needs     Financial resource strain: Not on file     Food insecurity     Worry: Not on file     Inability: Not on file     Transportation needs     Medical: Not on file     Non-medical: Not on file   Tobacco Use     Smoking status: Never Smoker     Smokeless tobacco: Never Used   Substance and Sexual Activity     Alcohol use: Yes     Alcohol/week: 2.0 standard drinks     Types: 2 Cans of beer per week     Drug use: Not Currently     Sexual activity: Not on file   Lifestyle     Physical activity     Days per week: Not on file     Minutes per session: Not on file     Stress: Not on file   Relationships     Social connections     Talks on phone: Not on file     Gets together: Not on file     Attends Judaism service: Not on file     Active member of club or organization: Not on file     Attends meetings of clubs or organizations: Not on file     Relationship status: Not on file     Intimate partner violence     Fear of current or ex partner: Not on file     Emotionally abused: Not on file     Physically abused: Not on file     Forced sexual activity: Not on file   Other Topics Concern     Not on file   Social History Narrative     Not on file       Family History  Family History   Problem Relation Age of Onset     Coronary Artery Disease Father      Cancer Maternal Grandfather        Preop Vitals    BP Readings from Last 3 Encounters:   12/18/20 107/79   12/08/20 125/79   09/09/19 115/74    Pulse Readings from Last 3 Encounters:   12/18/20 81   12/08/20  "65   09/09/19 68      Resp Readings from Last 3 Encounters:   09/09/19 16    SpO2 Readings from Last 3 Encounters:   12/18/20 94%   12/08/20 100%   09/09/19 99%      Temp Readings from Last 1 Encounters:   12/18/20 98.5  F (36.9  C)    Ht Readings from Last 1 Encounters:   12/24/20 1.778 m (5' 10\")      Wt Readings from Last 1 Encounters:   12/24/20 68 kg (150 lb)    Estimated body mass index is 21.52 kg/m  as calculated from the following:    Height as of this encounter: 1.778 m (5' 10\").    Weight as of this encounter: 68 kg (150 lb).     ROS/MED HX  The complete review of systems is negative other than noted in the HPI or here.   ENT/Pulmonary:  - neg pulmonary ROS     Neurologic:  - neg neurologic ROS     Cardiovascular:  - neg cardiovascular ROS   (+) ----. : . . . :. . No previous cardiac testing       METS/Exercise Tolerance:  >4 METS   Hematologic:  - neg hematologic  ROS       Musculoskeletal: Comment: S/p right knee meniscal repair 2010        GI/Hepatic:  - neg GI/hepatic ROS       Renal/Genitourinary:  - ROS Renal section negative       Endo:  - neg endo ROS       Psychiatric:  - neg psychiatric ROS       Infectious Disease:  - neg infectious disease ROS       Malignancy:      - no malignancy   Other:    (+) No chance of pregnancy C-spine cleared: N/A, H/O Chronic Pain,no other significant disability            PHYSICAL EXAM:   Mental Status/Neuro: A/A/O; Age Appropriate   Airway: Facies: Feasible  Mallampati: Not Assessed  Mouth/Opening: Not Assessed  TM distance: Not Assessed  Neck ROM: Not Assessed   Respiratory:    CV:    Comments: Virtual visit     Dental: Retainer             150 lbs 0 oz  5' 10\"   Body mass index is 21.52 kg/m .       Physical Exam  Constitutional: Awake, alert, no apparent distress, and appears stated age.  Respiratory: No cough or obvious dyspnea.  Neuropsychiatric: Calm, cooperative. Normal affect.     Please refer to the physical examination documented by the anesthesiologist " in the anesthesia record on the day of surgery    Labs: Not indicated  EKG: Not indicated     Xray right knee 8/3/20                                                     IMPRESSION: Nondisplaced fracture involving the right proximal fibula,  as seen on the comparison MRI, is not appreciated on the plain  radiographs.     8/26/20 MR right knee                                                         Impression:  1. Nondisplaced, likely intra-articular fracture of the proximal  fibula, extend not fully included in field-of-view.  2. Lateral tibial plateau bone contusion.  3. Recurrent/re-tear of the posterior horn of medial meniscus,  predominantly horizontal.    Imaging reviewed by this provider      ASSESSMENT and PLAN  Frank Huff is a 25 year old male scheduled to undergo Examination under anesthesia, arthroscopy and partial meniscectomy with Dr. Black on 12/29/20. He has the following specific operative considerations:   - RCRI : No serious cardiac risks.    - Anesthesia considerations:  Refer to PAC assessment in anesthesia records  - VTE risk: 0.5%  - [place] # of risks 1/8 = Low risk   - Risk of PONV score = 2.  If > 2, anti-emetic intervention recommended.    --Acute pain of right knee with above procedure now planned.   --No history of problems with anesthesia.  --Generally healthy with no significant cardiopulmonary history. Nonsmoker. Good activity tolerance.     Arrival time, NPO, shower and medication instructions provided by nursing staff today.     Patient is optimized and is acceptable candidate for the proposed procedure.  No further diagnostic evaluation is needed.             ROSARIO Ruggiero CNS  Preoperative Assessment Center  [facility]  Phone: [phone]  Fax: [phone]

## 2020-12-24 NOTE — PROGRESS NOTES
"Frank Huff is a 25 year old male who is being evaluated via a billable video visit.      The patient has been notified of following:     \"This video visit will be conducted via a call between you and your physician/provider. We have found that certain health care needs can be provided without the need for an in-person physical exam.  This service lets us provide the care you need with a video conversation.  If a prescription is necessary we can send it directly to your pharmacy.  If lab work is needed we can place an order for that and you can then stop by our lab to have the test done at a later time.    Video visits are billed at different rates depending on your insurance coverage.  Please reach out to your insurance provider with any questions.    If during the course of the call the physician/provider feels a video visit is not appropriate, you will not be charged for this service.\"    Patient has given verbal consent for Video visit? Yes  How would you like to obtain your AVS? Elinahart  If you are dropped from the video visit, the video invite should be resent to: Other e-mail: Ryley  Will anyone else be joining your video visit? No    Jacob Spaulding        "

## 2020-12-28 DIAGNOSIS — Z11.59 ENCOUNTER FOR SCREENING FOR OTHER VIRAL DISEASES: ICD-10-CM

## 2020-12-28 LAB
LABORATORY COMMENT REPORT: NORMAL
SARS-COV-2 RNA SPEC QL NAA+PROBE: NEGATIVE
SARS-COV-2 RNA SPEC QL NAA+PROBE: NORMAL
SPECIMEN SOURCE: NORMAL
SPECIMEN SOURCE: NORMAL

## 2020-12-28 PROCEDURE — U0003 INFECTIOUS AGENT DETECTION BY NUCLEIC ACID (DNA OR RNA); SEVERE ACUTE RESPIRATORY SYNDROME CORONAVIRUS 2 (SARS-COV-2) (CORONAVIRUS DISEASE [COVID-19]), AMPLIFIED PROBE TECHNIQUE, MAKING USE OF HIGH THROUGHPUT TECHNOLOGIES AS DESCRIBED BY CMS-2020-01-R: HCPCS | Performed by: PATHOLOGY

## 2020-12-28 RX ORDER — FENTANYL CITRATE 50 UG/ML
25-50 INJECTION, SOLUTION INTRAMUSCULAR; INTRAVENOUS
Status: CANCELLED | OUTPATIENT
Start: 2020-12-28

## 2020-12-29 ENCOUNTER — HOSPITAL ENCOUNTER (OUTPATIENT)
Facility: AMBULATORY SURGERY CENTER | Age: 25
Discharge: HOME | End: 2020-12-29
Attending: ORTHOPAEDIC SURGERY | Admitting: ORTHOPAEDIC SURGERY
Payer: COMMERCIAL

## 2020-12-29 ENCOUNTER — ANESTHESIA (OUTPATIENT)
Dept: SURGERY | Facility: AMBULATORY SURGERY CENTER | Age: 25
End: 2020-12-29

## 2020-12-29 VITALS
RESPIRATION RATE: 16 BRPM | BODY MASS INDEX: 21.47 KG/M2 | WEIGHT: 150 LBS | OXYGEN SATURATION: 100 % | HEART RATE: 57 BPM | DIASTOLIC BLOOD PRESSURE: 55 MMHG | HEIGHT: 70 IN | TEMPERATURE: 97.3 F | SYSTOLIC BLOOD PRESSURE: 88 MMHG

## 2020-12-29 DIAGNOSIS — M25.561 ACUTE PAIN OF RIGHT KNEE: Primary | ICD-10-CM

## 2020-12-29 DIAGNOSIS — M25.561 ACUTE PAIN OF RIGHT KNEE: ICD-10-CM

## 2020-12-29 PROCEDURE — 29881 ARTHRS KNE SRG MNISECTMY M/L: CPT | Mod: RT

## 2020-12-29 PROCEDURE — 29881 ARTHRS KNE SRG MNISECTMY M/L: CPT | Mod: RT | Performed by: ORTHOPAEDIC SURGERY

## 2020-12-29 RX ORDER — AMOXICILLIN 250 MG
1-2 CAPSULE ORAL 2 TIMES DAILY
Qty: 30 TABLET | Refills: 0 | Status: SHIPPED | OUTPATIENT
Start: 2020-12-29

## 2020-12-29 RX ORDER — PROPOFOL 10 MG/ML
INJECTION, EMULSION INTRAVENOUS CONTINUOUS PRN
Status: DISCONTINUED | OUTPATIENT
Start: 2020-12-29 | End: 2020-12-29

## 2020-12-29 RX ORDER — GLYCOPYRROLATE 0.2 MG/ML
INJECTION, SOLUTION INTRAMUSCULAR; INTRAVENOUS PRN
Status: DISCONTINUED | OUTPATIENT
Start: 2020-12-29 | End: 2020-12-29

## 2020-12-29 RX ORDER — ACETAMINOPHEN 325 MG/1
975 TABLET ORAL ONCE
Status: COMPLETED | OUTPATIENT
Start: 2020-12-29 | End: 2020-12-29

## 2020-12-29 RX ORDER — ACETAMINOPHEN 325 MG/1
650 TABLET ORAL EVERY 4 HOURS
Qty: 50 TABLET | Refills: 0 | Status: SHIPPED | OUTPATIENT
Start: 2020-12-29

## 2020-12-29 RX ORDER — NALOXONE HYDROCHLORIDE 0.4 MG/ML
0.2 INJECTION, SOLUTION INTRAMUSCULAR; INTRAVENOUS; SUBCUTANEOUS
Status: DISCONTINUED | OUTPATIENT
Start: 2020-12-29 | End: 2020-12-30 | Stop reason: HOSPADM

## 2020-12-29 RX ORDER — NALOXONE HYDROCHLORIDE 0.4 MG/ML
0.4 INJECTION, SOLUTION INTRAMUSCULAR; INTRAVENOUS; SUBCUTANEOUS
Status: DISCONTINUED | OUTPATIENT
Start: 2020-12-29 | End: 2020-12-30 | Stop reason: HOSPADM

## 2020-12-29 RX ORDER — ONDANSETRON 4 MG/1
4-8 TABLET, ORALLY DISINTEGRATING ORAL EVERY 8 HOURS PRN
Qty: 15 TABLET | Refills: 0 | Status: SHIPPED | OUTPATIENT
Start: 2020-12-29

## 2020-12-29 RX ORDER — SODIUM CHLORIDE, SODIUM LACTATE, POTASSIUM CHLORIDE, CALCIUM CHLORIDE 600; 310; 30; 20 MG/100ML; MG/100ML; MG/100ML; MG/100ML
INJECTION, SOLUTION INTRAVENOUS CONTINUOUS
Status: DISCONTINUED | OUTPATIENT
Start: 2020-12-29 | End: 2020-12-30 | Stop reason: HOSPADM

## 2020-12-29 RX ORDER — ONDANSETRON 2 MG/ML
4 INJECTION INTRAMUSCULAR; INTRAVENOUS EVERY 30 MIN PRN
Status: DISCONTINUED | OUTPATIENT
Start: 2020-12-29 | End: 2020-12-30 | Stop reason: HOSPADM

## 2020-12-29 RX ORDER — HYDROXYZINE HYDROCHLORIDE 25 MG/1
25 TABLET, FILM COATED ORAL
Status: DISCONTINUED | OUTPATIENT
Start: 2020-12-29 | End: 2020-12-30 | Stop reason: HOSPADM

## 2020-12-29 RX ORDER — GABAPENTIN 300 MG/1
300 CAPSULE ORAL ONCE
Status: COMPLETED | OUTPATIENT
Start: 2020-12-29 | End: 2020-12-29

## 2020-12-29 RX ORDER — MEPERIDINE HYDROCHLORIDE 25 MG/ML
12.5 INJECTION INTRAMUSCULAR; INTRAVENOUS; SUBCUTANEOUS
Status: DISCONTINUED | OUTPATIENT
Start: 2020-12-29 | End: 2020-12-30 | Stop reason: HOSPADM

## 2020-12-29 RX ORDER — KETOROLAC TROMETHAMINE 30 MG/ML
INJECTION, SOLUTION INTRAMUSCULAR; INTRAVENOUS PRN
Status: DISCONTINUED | OUTPATIENT
Start: 2020-12-29 | End: 2020-12-29

## 2020-12-29 RX ORDER — OXYCODONE HYDROCHLORIDE 5 MG/1
5-10 TABLET ORAL EVERY 4 HOURS PRN
Qty: 15 TABLET | Refills: 0 | Status: SHIPPED | OUTPATIENT
Start: 2020-12-29

## 2020-12-29 RX ORDER — ONDANSETRON 4 MG/1
4 TABLET, ORALLY DISINTEGRATING ORAL
Status: DISCONTINUED | OUTPATIENT
Start: 2020-12-29 | End: 2020-12-30 | Stop reason: HOSPADM

## 2020-12-29 RX ORDER — LIDOCAINE 40 MG/G
CREAM TOPICAL
Status: DISCONTINUED | OUTPATIENT
Start: 2020-12-29 | End: 2020-12-29 | Stop reason: HOSPADM

## 2020-12-29 RX ORDER — ONDANSETRON 2 MG/ML
INJECTION INTRAMUSCULAR; INTRAVENOUS PRN
Status: DISCONTINUED | OUTPATIENT
Start: 2020-12-29 | End: 2020-12-29

## 2020-12-29 RX ORDER — DEXAMETHASONE SODIUM PHOSPHATE 4 MG/ML
INJECTION, SOLUTION INTRA-ARTICULAR; INTRALESIONAL; INTRAMUSCULAR; INTRAVENOUS; SOFT TISSUE PRN
Status: DISCONTINUED | OUTPATIENT
Start: 2020-12-29 | End: 2020-12-29

## 2020-12-29 RX ORDER — LIDOCAINE HYDROCHLORIDE 20 MG/ML
INJECTION, SOLUTION INFILTRATION; PERINEURAL PRN
Status: DISCONTINUED | OUTPATIENT
Start: 2020-12-29 | End: 2020-12-29

## 2020-12-29 RX ORDER — BUPIVACAINE HYDROCHLORIDE AND EPINEPHRINE 2.5; 5 MG/ML; UG/ML
INJECTION, SOLUTION INFILTRATION; PERINEURAL PRN
Status: DISCONTINUED | OUTPATIENT
Start: 2020-12-29 | End: 2020-12-29 | Stop reason: HOSPADM

## 2020-12-29 RX ORDER — ONDANSETRON 4 MG/1
4 TABLET, ORALLY DISINTEGRATING ORAL EVERY 30 MIN PRN
Status: DISCONTINUED | OUTPATIENT
Start: 2020-12-29 | End: 2020-12-30 | Stop reason: HOSPADM

## 2020-12-29 RX ORDER — PROPOFOL 10 MG/ML
INJECTION, EMULSION INTRAVENOUS PRN
Status: DISCONTINUED | OUTPATIENT
Start: 2020-12-29 | End: 2020-12-29

## 2020-12-29 RX ORDER — CEFAZOLIN SODIUM 2 G/50ML
2 SOLUTION INTRAVENOUS
Status: COMPLETED | OUTPATIENT
Start: 2020-12-29 | End: 2020-12-29

## 2020-12-29 RX ORDER — CEFAZOLIN SODIUM 1 G/50ML
1 SOLUTION INTRAVENOUS SEE ADMIN INSTRUCTIONS
Status: DISCONTINUED | OUTPATIENT
Start: 2020-12-29 | End: 2020-12-29 | Stop reason: HOSPADM

## 2020-12-29 RX ORDER — SODIUM CHLORIDE, SODIUM LACTATE, POTASSIUM CHLORIDE, CALCIUM CHLORIDE 600; 310; 30; 20 MG/100ML; MG/100ML; MG/100ML; MG/100ML
INJECTION, SOLUTION INTRAVENOUS CONTINUOUS
Status: DISCONTINUED | OUTPATIENT
Start: 2020-12-29 | End: 2020-12-29 | Stop reason: HOSPADM

## 2020-12-29 RX ORDER — FENTANYL CITRATE 50 UG/ML
25-50 INJECTION, SOLUTION INTRAMUSCULAR; INTRAVENOUS
Status: DISCONTINUED | OUTPATIENT
Start: 2020-12-29 | End: 2020-12-30 | Stop reason: HOSPADM

## 2020-12-29 RX ADMIN — LIDOCAINE HYDROCHLORIDE 70 MG: 20 INJECTION, SOLUTION INFILTRATION; PERINEURAL at 08:16

## 2020-12-29 RX ADMIN — GLYCOPYRROLATE 0.2 MG: 0.2 INJECTION, SOLUTION INTRAMUSCULAR; INTRAVENOUS at 08:08

## 2020-12-29 RX ADMIN — ACETAMINOPHEN 975 MG: 325 TABLET ORAL at 06:58

## 2020-12-29 RX ADMIN — CEFAZOLIN SODIUM 2 G: 2 SOLUTION INTRAVENOUS at 08:26

## 2020-12-29 RX ADMIN — DEXAMETHASONE SODIUM PHOSPHATE 4 MG: 4 INJECTION, SOLUTION INTRA-ARTICULAR; INTRALESIONAL; INTRAMUSCULAR; INTRAVENOUS; SOFT TISSUE at 08:20

## 2020-12-29 RX ADMIN — KETOROLAC TROMETHAMINE 30 MG: 30 INJECTION, SOLUTION INTRAMUSCULAR; INTRAVENOUS at 08:47

## 2020-12-29 RX ADMIN — SODIUM CHLORIDE, SODIUM LACTATE, POTASSIUM CHLORIDE, CALCIUM CHLORIDE: 600; 310; 30; 20 INJECTION, SOLUTION INTRAVENOUS at 07:18

## 2020-12-29 RX ADMIN — PROPOFOL 200 MG: 10 INJECTION, EMULSION INTRAVENOUS at 08:16

## 2020-12-29 RX ADMIN — ONDANSETRON 4 MG: 2 INJECTION INTRAMUSCULAR; INTRAVENOUS at 08:20

## 2020-12-29 RX ADMIN — GABAPENTIN 300 MG: 300 CAPSULE ORAL at 06:58

## 2020-12-29 RX ADMIN — PROPOFOL 200 MCG/KG/MIN: 10 INJECTION, EMULSION INTRAVENOUS at 08:16

## 2020-12-29 ASSESSMENT — MIFFLIN-ST. JEOR: SCORE: 1666.65

## 2020-12-29 NOTE — ANESTHESIA POSTPROCEDURE EVALUATION
Anesthesia POST Procedure Evaluation    Patient: Frank Huff   MRN:     [id number] Gender:   male   Age:    25 year old :      1995        Preoperative Diagnosis: Acute pain of right knee [M25.561]   Procedure(s):  Examination under anesthesia, arthroscopy and partial meniscectomy   Postop Comments: No value filed.     Anesthesia Type: General       Disposition: Outpatient   Postop Pain Control: Uneventful            Sign Out: Well controlled pain   PONV: No   Neuro/Psych: Uneventful            Sign Out: Acceptable/Baseline neuro status   Airway/Respiratory: Uneventful            Sign Out: Acceptable/Baseline resp. status   CV/Hemodynamics: Uneventful            Sign Out: Acceptable CV status   Other NRE: NONE   DID A NON-ROUTINE EVENT OCCUR? No         Last Anesthesia Record Vitals:  CRNA VITALS  2020 0833 - 2020 0933      2020             Pulse:  77    SpO2:  97 %    Resp Rate (observed):  (!) 4    Resp Rate (set):  10          Last PACU Vitals:  Vitals Value Taken Time   BP 88/58 20 0925   Temp 36.1  C (97  F) 20 0915   Pulse 66 20 0926   Resp 0 20 0926   SpO2 100 % 20 0926   Temp src     NIBP     Pulse     SpO2     Resp     Temp     Ht Rate     Temp 2     Vitals shown include unvalidated device data.      Electronically Signed By: Figueroa Browne MD, 2020, 9:40 AM

## 2020-12-29 NOTE — OP NOTE
PREOPERATIVE DIAGNOSIS:   1. Right knee recurrent medial meniscus tear status post repair approximately 9 years ago    POSTOPERATIVE DIAGNOSIS:  1. Right knee recurrent medial meniscus tear status post repair approximately 9 years ago.  Unrepairable.  2. Stable ACL    PROCEDURE:  1. Examination under anesthesia right knee  2. Right knee arthroscopy  3. Arthroscopic partial medial meniscectomy  4. Arthroscopic removal of the deep sutures and all inside device from meniscus repair    DATE OF SURGERY: 12/29/2020    SURGEON: Greg Black MD    ASSISTANT: Roger Ramachandran PA-C.  The assistance of Ms. ramachandran was necessary for patient positioning arthroscopic visualization.     RESIDENT OR FELLOW: Valerio Moss MD    OPERATIVE INDICATIONS: Frank Huff is a pleasant 25 year old male who I saw through my orthopedic clinic with a history, physical, imaging consistent with right knee medial sided symptoms.  He actually contusion to the lateral aspect of his tibial plateau from playing basketball.  This seem to be improving.  He did have what appeared to be a large meniscus tear.  Approximately 10 years ago he had undergone a meniscus repair with initially good improvement of his symptoms.  I felt his new imaging was consistent with retear of his meniscus I offered him an arthroscopic meniscectomy.  We also plan to evaluate his ACL under anesthesia.  I reviewed with the patient the risks, benefits, complications, techniques and alternatives to surgery.  We reviewed the expected course of recovery and the potential expected outcomes.  The patient understood both the risks and benefits and desired to proceed despite the risks.    OPERATIVE DETAILS: In the preoperative area the patient's informed consent was reviewed and they desired to proceed.  The right leg was marked and the patient was in agreement.  The patient was taken to the operating room where a timeout was performed and all parties were in agreement.   Preoperative antibiotics were given within 1 hour of the time of incision.  The patient was placed in the supine position and surrendered to LMA anesthesia.  No tourniquet was applied.  Egg crate was placed beneath the well leg and a side post was utilized.  The operative leg was prepped and draped in the usual sterile fashion.     Examination Under Anesthesia:    Range of motion was 0 to 140 degrees.  Stable to varus and valgus stress testing.  Stable posterior drawer testing.  Lachman showed trace increased translation with firm endpoint.  No pivot shift.    Anterior medial and anterolateral arthroscopic portals reported in the diagnostic arthroscopy was performed the following findings: There were no loose bodies within the suprapatellar pouch, medial gutter, lateral gutter medial femoral condyle and medial tibial plateau showed grade 1 softening but otherwise was largely intact.  Large tear medial meniscus.  Prior sutures noted from the meniscus repair.  ACL PCL were intact and showed good tension to probing.  Lateral femoral condyle lateral tibial plateau was normal.  Medial patella facet central ridge lateral patella facet central trochlea was normal.    At this time working with a biter as well as a shaver a partial medial meniscectomy was completed until about stable rim of meniscus remained in the medial compartment.  This represented a fairly large portion of his medial meniscus.  A grasper was then introduced and removal of the deep suture and all-inside repair device was then performed with a grasper.    Copious irrigation was performed an a layered closure was initiated, sterile dressings were applied and the patient was transferred to the recovery room in stable condition with stable vital signs.    ESTIMATED BLOOD LOSS: 5 mL.    TOURNIQUET TIME: No tourniquet was placed.    COMPLICATIONS: None apparent.    DRAINS: None.    SPECIMENS: None.     POSTOPERATIVE PLAN:  1. Weightbearing as  tolerated  2. Range of motion as tolerated  3. No running or jumping for the first 6 weeks  4. Follow-up my clinic 1 week  5. Shower on day 3.  No submerging the wounds

## 2020-12-29 NOTE — ANESTHESIA CARE TRANSFER NOTE
Patient: Frank Huff    Procedure(s):  Examination under anesthesia, arthroscopy and partial meniscectomy    Diagnosis: Acute pain of right knee [M25.561]  Diagnosis Additional Information: No value filed.    Anesthesia Type:   General     Note:  Airway :Room Air  Patient transferred to:PACU  Comments: VSS and WNL, comfortable, no PONV, report to Bonilla RNHandoff Report: Identifed the Patient, Identified the Reponsible Provider, Reviewed the pertinent medical history, Discussed the surgical course, Reviewed Intra-OP anesthesia mangement and issues during anesthesia, Set expectations for post-procedure period and Allowed opportunity for questions and acknowledgement of understanding      Vitals: (Last set prior to Anesthesia Care Transfer)    CRNA VITALS  12/29/2020 0833 - 12/29/2020 0909      12/29/2020             Pulse:  77    SpO2:  97 %    Resp Rate (observed):  (!) 4    Resp Rate (set):  10                Electronically Signed By: ROSARIO Guevara CRNA  December 29, 2020  9:09 AM

## 2020-12-29 NOTE — DISCHARGE INSTRUCTIONS
"[facility]  Home Care Following Anesthesia  For 24 hours after surgery:  1. Get plenty of rest.  A responsible adult must stay with you for at least 24 hours after you leave the surgery center.  2. Do not drive or use heavy equipment.  If you have weakness or tingling, don't drive or use heavy equipment until this feeling goes away.   3. Do not drink alcohol.   4. Avoid strenuous or risky activities.  Ask for help when climbing stairs.  5. You may feel lightheaded.  IF so, sit for a few minutes before standing.  Have someone help you get up.   6. If you have nausea (feel sick to your stomach): Drink only clear liquids such as apple juice, ginger ale, broth or 7-Up.  Rest may also help.  Be sure to drink enough fluids.  Move to a regular diet as you feel able.   7. You may have a slight fever.  Call the doctor if your fever is over 100 F (37.7 C) (taken under the tongue) or lasts longer than 24 hours.  8. You may have a dry mouth, a sore throat, muscle aches or trouble sleeping. These should go away after 24 hours.  9. Do not make important or legal decisions.        Today you received a Marcaine or bupivacaine block to numb the nerves near your surgery site.  This is a block using local anesthetic or \"numbing\" medication injected around the nerves to anesthetize or \"numb\" the area supplied by those nerves.  This block is injected into the muscle layer near your surgical site.  The medication may numb the location where you had surgery for 6-18 hours, but may last up to 24 hours.  If your surgical site is an arm or leg you should be careful with your affected limb, since it is possible to injure your limb without being aware of it due to the numbing.  Until full feeling returns, you should guard against bumping or hitting your limb, and avoid extreme hot or cold temperatures on the skin.  As the block wears off, the feeling will return as a tingling or prickly sensation near your " surgical site.  You will experience more discomfort from your incision as the feeling returns.  You may want to take a pain pill (a narcotic or Tylenol if this was prescribed by your surgeon) when you start to experience mild pain before the pain beccomes more severe.  If your pain medications do not control your pain you should notifiy your surgeon.    Tips for taking pain medications  To get the best pain relief possible, remember these points:    Take pain medications as directed, before pain becomes severe.    Pain medication can upset your stomach: taking it with food may help.    Constipation is a common side effect of pain medication. Drink plenty of  fluids.    Eat foods high in fiber. Take a stool softener if recommended by your doctor or pharmacist.    Do not drink alcohol, drive or operate machinery while taking pain medications.    Ask about other ways to control pain, such as with heat, ice or relaxation.    Tylenol/Acetaminophen Consumption  To help encourage the safe use of acetaminophen, the makers of TYLENOL  have lowered the maximum daily dose for single-ingredient Extra Strength TYLENOL  (acetaminophen) products sold in the [place] from 8 pills per day (4,000 mg) to 6 pills per day (3,000 mg). The dosing interval has also changed from 2 pills every 4-6 hours to 2 pills every 6 hours.    If you feel your pain relief is insufficient, you may take Tylenol/Acetaminophen in addition to your narcotic pain medication.     Be careful not to exceed 3,000 mg of Tylenol/Acetaminophen in a 24 hour period from all sources.    If you are taking extra strength Tylenol/acetaminophen (500 mg), the maximum dose is 6 tablets in 24 hours.    If you are taking regular strength acetaminophen (325 mg), the maximum dose is 9 tablets in 24 hours.    **975 mg Tylenol given at 7, can take again at 1 PM    Call a doctor for any of the followin. Signs of infection (fever, growing tenderness at the surgery site, a large  "amount of drainage or bleeding, severe pain, foul-smelling drainage, redness, swelling).  2. It has been over 8 to 10 hours since surgery and you are still not able to urinate (pass water).  3. Headache for over 24 hours.  4. Signs of Covid-19 infection (temperature over 100 degrees, shortness of breath, cough, loss of taste/smell, generalized body aches, persistent headache, chills, sore throat, nausea/vomiting/diarrhea)  Your doctor is:       Dr. Greg Black, Orthopaedics: [phone]               Or dial [phone] and ask for the resident on call for:  Orthopaedics  For emergency care, call the:  [facility] Emergency Department: [phone] (TTY for hearing impaired: [phone])      Safety Tips for Using Crutches    Crutch Fit:    Assume good standing posture with shoulders relaxed and crutch tips 6-8 inches out from the side of the foot.    The underarm pad should fall 2-3 fingers width below the armpit.    The handgrip is positioned level with the wrist to allow 30  flexion at the elbow.    Safety Tips:    Bear weight on your hands, not on your armpits.    Do not add extra padding to the underarm pad. This will, in effect, lengthen the crutches and increase risk of nerve injury.    Wear flat, properly fitting shoes. Do not walk in stocking feet, high heels or slippers.    Household hazards:  --Throw rugs should be removed from floors.  --Stairs should be cleared of obstacles.  --Use extra caution on slippery, highly polished, littered or uneven floor surfaces.  --Check for electric cords.    Check crutch tips for excessive wear and keep wing nuts tight.    While walking, look forward with  head up  and  eyes open.  Take equal length steps.    Use BOTH crutches.    Stairs Sequence:    UP: \"Good\" leg first, followed by  bad  leg, then crutches.    DOWN: Crutches, followed by  bad  leg, \"good\" leg.     Walking with Crutches:    Move both crutches forward at the same time.    Non-Weight Bearing (NWB):  " Hold the involved leg up and swing through the crutches with the involved leg. The involved leg does not touch the floor.    Toe Touch Weight Bearing (TTWB): Move the involved leg forward. Rest it lightly on the floor for balance only. Step through the crutches with the uninvolved leg.    Partial Weight Bearing (PWB): Move the involved leg forward. Step down the weight of the leg only.  Step through the crutches with the uninvolved leg.    Weight Bearing As Tolerated (WBAT): Move the involved leg forward. Put as much pressure through the involved leg as you can tolerate comfortably. Then step through the crutches with the uninvolved leg.

## 2021-01-03 ENCOUNTER — HEALTH MAINTENANCE LETTER (OUTPATIENT)
Age: 26
End: 2021-01-03

## 2021-01-06 ENCOUNTER — OFFICE VISIT (OUTPATIENT)
Dept: ORTHOPEDICS | Facility: CLINIC | Age: 26
End: 2021-01-06
Payer: COMMERCIAL

## 2021-01-06 DIAGNOSIS — S89.91XD ACUTE INJURY OF RIGHT KNEE CARTILAGE, SUBSEQUENT ENCOUNTER: Primary | ICD-10-CM

## 2021-01-06 PROCEDURE — 99024 POSTOP FOLLOW-UP VISIT: CPT | Mod: GC | Performed by: STUDENT IN AN ORGANIZED HEALTH CARE EDUCATION/TRAINING PROGRAM

## 2021-01-06 NOTE — PROGRESS NOTES
POSTOPERATIVE DIAGNOSIS:  1. Right knee recurrent medial meniscus tear status post repair approximately 9 years ago.  Unrepairable.  2. Stable ACL     PROCEDURE:  1. Examination under anesthesia right knee  2. Right knee arthroscopy  3. Arthroscopic partial medial meniscectomy  4. Arthroscopic removal of the deep sutures and all inside device from meniscus repair     DATE OF SURGERY: 12/29/2020    HISTORY:  Pt returns post-operatively. Is doing well overall. Pain is controlled. Off of narcotics. Has been compliant with post-operative restrictions and guidelines. Denies numbness, tingling, fever, chills, wound drainage, redness, or other signs of infection.    EXAM:   General: Awake, Alert, and oriented. Articulates and communicates with a normal affect     Right Lower Extremity/Knee Exam:    Incisions well healed without evidence of infection    Normal post-operative effusion and ecchymosis    Range of motion and stability exam not performed    Neurovascularly intact    Brisk cap refill      ASSESSMENT:  25 year old male s/p above procedure(s)    PLAN:   1.  May continue weightbearing as tolerated and range of motion as tolerated  2.  He would like to do physical therapy on his own and has a friend is a physical therapist.  3.  Follow-up as needed      Valerio Moss MD  Fellow, Sports Medicine & Shoulder  [facility]      Answers for HPI/ROS submitted by the patient on 1/6/2021   General Symptoms: No  Skin Symptoms: No  HENT Symptoms: No  EYE SYMPTOMS: No  HEART SYMPTOMS: No  LUNG SYMPTOMS: No  INTESTINAL SYMPTOMS: No  URINARY SYMPTOMS: No  REPRODUCTIVE SYMPTOMS: No  SKELETAL SYMPTOMS: No  BLOOD SYMPTOMS: No  NERVOUS SYSTEM SYMPTOMS: No  MENTAL HEALTH SYMPTOMS: No

## 2021-01-06 NOTE — LETTER
1/6/2021         RE: Frank Huff  [address]  [facility] [address]        Dear Colleague,    Thank you for referring your patient, Frank Huff, to the [facility] [place]. Please see a copy of my visit note below.    Patient seen and examined with the resident.     Assesment: 1 week following meniscectomy s/p prior repair      Plan: Weightbearing as tolerated    Range of motion as tolerated    Sutures removed in clinic    Leave steri-strips in place until they fall off    OK to shower allowing water to run over incision    No soaking, scrubbing, baths, or lake for 1 additional week    Continue PT as scheduled     Pain medications reviewed and no refills required.     Operative report provided and arthroscopic images reviewed    Follow up at 6 weeks from the date of surgery with no new X-Rays needed    I agree with history, physical and imaging as well as the assessment and plan as detailed by Dr. Moss.       POSTOPERATIVE DIAGNOSIS:  1. Right knee recurrent medial meniscus tear status post repair approximately 9 years ago.  Unrepairable.  2. Stable ACL     PROCEDURE:  1. Examination under anesthesia right knee  2. Right knee arthroscopy  3. Arthroscopic partial medial meniscectomy  4. Arthroscopic removal of the deep sutures and all inside device from meniscus repair     DATE OF SURGERY: 12/29/2020    HISTORY:  Pt returns post-operatively. Is doing well overall. Pain is controlled. Off of narcotics. Has been compliant with post-operative restrictions and guidelines. Denies numbness, tingling, fever, chills, wound drainage, redness, or other signs of infection.    EXAM:   General: Awake, Alert, and oriented. Articulates and communicates with a normal affect     Right Lower Extremity/Knee Exam:    Incisions well healed without evidence of infection    Normal post-operative effusion and ecchymosis    Range of motion and stability exam not  performed    Neurovascularly intact    Brisk cap refill      ASSESSMENT:  25 year old male s/p above procedure(s)    PLAN:   1.  May continue weightbearing as tolerated and range of motion as tolerated  2.  He would like to do physical therapy on his own and has a friend is a physical therapist.  3.  Follow-up as needed      Valerio Moss MD  Fellow, Sports Medicine & Shoulder  [facility]      Answers for HPI/ROS submitted by the patient on 1/6/2021   General Symptoms: No  Skin Symptoms: No  HENT Symptoms: No  EYE SYMPTOMS: No  HEART SYMPTOMS: No  LUNG SYMPTOMS: No  INTESTINAL SYMPTOMS: No  URINARY SYMPTOMS: No  REPRODUCTIVE SYMPTOMS: No  SKELETAL SYMPTOMS: No  BLOOD SYMPTOMS: No  NERVOUS SYSTEM SYMPTOMS: No  MENTAL HEALTH SYMPTOMS: No        Again, thank you for allowing me to participate in the care of your patient.        Sincerely,        Greg Black MD

## 2021-01-06 NOTE — PROGRESS NOTES
Patient seen and examined with the resident.     Assesment: 1 week following meniscectomy s/p prior repair      Plan: Weightbearing as tolerated    Range of motion as tolerated    Sutures removed in clinic    Leave steri-strips in place until they fall off    OK to shower allowing water to run over incision    No soaking, scrubbing, baths, or lake for 1 additional week    Continue PT as scheduled     Pain medications reviewed and no refills required.     Operative report provided and arthroscopic images reviewed    Follow up at 6 weeks from the date of surgery with no new X-Rays needed    I agree with history, physical and imaging as well as the assessment and plan as detailed by Dr. Moss.

## 2021-10-10 ENCOUNTER — HEALTH MAINTENANCE LETTER (OUTPATIENT)
Age: 26
End: 2021-10-10

## 2022-01-30 ENCOUNTER — HEALTH MAINTENANCE LETTER (OUTPATIENT)
Age: 27
End: 2022-01-30

## 2022-09-18 ENCOUNTER — HEALTH MAINTENANCE LETTER (OUTPATIENT)
Age: 27
End: 2022-09-18

## 2023-05-07 ENCOUNTER — HEALTH MAINTENANCE LETTER (OUTPATIENT)
Age: 28
End: 2023-05-07

## (undated) DEVICE — LINEN DRAPE 54X72" 5467

## (undated) DEVICE — BUR ARTHREX COOLCUT SABRE 4.0MMX13CM AR-8400SR

## (undated) DEVICE — LINEN TOWEL PACK X5 5464

## (undated) DEVICE — SOL NACL 0.9% IRRIG 3000ML BAG 2B7477

## (undated) DEVICE — PACK ARTHROSCOPY CUSTOM ASC

## (undated) DEVICE — GLOVE PROTEXIS BLUE W/NEU-THERA 8.0  2D73EB80

## (undated) DEVICE — SU ETHILON 3-0 PS-1 18" 1663H

## (undated) DEVICE — SUCTION MANIFOLD NEPTUNE 2 SYS 4 PORT 0702-020-000

## (undated) DEVICE — PREP DURAPREP 26ML APL 8630

## (undated) DEVICE — TUBING SYSTEM ARTHREX PATIENT REDEUCE AR-6421

## (undated) DEVICE — GLOVE PROTEXIS POWDER FREE SMT 8.0  2D72PT80X

## (undated) DEVICE — PAD ARMBOARD FOAM EGGCRATE COVIDEN 3114367

## (undated) RX ORDER — LIDOCAINE HYDROCHLORIDE 20 MG/ML
INJECTION, SOLUTION EPIDURAL; INFILTRATION; INTRACAUDAL; PERINEURAL
Status: DISPENSED
Start: 2020-12-29

## (undated) RX ORDER — ONDANSETRON 2 MG/ML
INJECTION INTRAMUSCULAR; INTRAVENOUS
Status: DISPENSED
Start: 2020-12-29

## (undated) RX ORDER — GABAPENTIN 300 MG/1
CAPSULE ORAL
Status: DISPENSED
Start: 2020-12-29

## (undated) RX ORDER — DEXAMETHASONE SODIUM PHOSPHATE 4 MG/ML
INJECTION, SOLUTION INTRA-ARTICULAR; INTRALESIONAL; INTRAMUSCULAR; INTRAVENOUS; SOFT TISSUE
Status: DISPENSED
Start: 2020-12-29

## (undated) RX ORDER — GLYCOPYRROLATE 0.2 MG/ML
INJECTION, SOLUTION INTRAMUSCULAR; INTRAVENOUS
Status: DISPENSED
Start: 2020-12-29

## (undated) RX ORDER — ACETAMINOPHEN 325 MG/1
TABLET ORAL
Status: DISPENSED
Start: 2020-12-29

## (undated) RX ORDER — BUPIVACAINE HYDROCHLORIDE 2.5 MG/ML
INJECTION, SOLUTION EPIDURAL; INFILTRATION; INTRACAUDAL
Status: DISPENSED
Start: 2020-12-29

## (undated) RX ORDER — FENTANYL CITRATE 50 UG/ML
INJECTION, SOLUTION INTRAMUSCULAR; INTRAVENOUS
Status: DISPENSED
Start: 2020-12-29

## (undated) RX ORDER — EPINEPHRINE 1 MG/ML
INJECTION, SOLUTION, CONCENTRATE INTRAVENOUS
Status: DISPENSED
Start: 2020-12-29

## (undated) RX ORDER — CEFAZOLIN SODIUM 1 G/3ML
INJECTION, POWDER, FOR SOLUTION INTRAMUSCULAR; INTRAVENOUS
Status: DISPENSED
Start: 2020-12-29

## (undated) RX ORDER — PROPOFOL 10 MG/ML
INJECTION, EMULSION INTRAVENOUS
Status: DISPENSED
Start: 2020-12-29

## (undated) RX ORDER — KETOROLAC TROMETHAMINE 30 MG/ML
INJECTION, SOLUTION INTRAMUSCULAR; INTRAVENOUS
Status: DISPENSED
Start: 2020-12-29